# Patient Record
Sex: FEMALE | Race: WHITE | NOT HISPANIC OR LATINO | Employment: PART TIME | URBAN - METROPOLITAN AREA
[De-identification: names, ages, dates, MRNs, and addresses within clinical notes are randomized per-mention and may not be internally consistent; named-entity substitution may affect disease eponyms.]

---

## 2020-06-27 ENCOUNTER — APPOINTMENT (EMERGENCY)
Dept: RADIOLOGY | Facility: HOSPITAL | Age: 30
DRG: 176 | End: 2020-06-27
Payer: COMMERCIAL

## 2020-06-27 ENCOUNTER — HOSPITAL ENCOUNTER (INPATIENT)
Facility: HOSPITAL | Age: 30
LOS: 2 days | Discharge: HOME | DRG: 176 | End: 2020-06-29
Attending: EMERGENCY MEDICINE | Admitting: INTERNAL MEDICINE
Payer: COMMERCIAL

## 2020-06-27 ENCOUNTER — APPOINTMENT (EMERGENCY)
Dept: CT IMAGING | Facility: HOSPITAL | Age: 30
DRG: 176 | End: 2020-06-27
Payer: COMMERCIAL

## 2020-06-27 DIAGNOSIS — I26.99 ACUTE PULMONARY EMBOLISM (HCC): Primary | ICD-10-CM

## 2020-06-27 DIAGNOSIS — R94.31 EKG ABNORMALITY: ICD-10-CM

## 2020-06-27 PROBLEM — R07.9 CHEST PAIN: Status: ACTIVE | Noted: 2020-06-27

## 2020-06-27 LAB
ALBUMIN SERPL BCP-MCNC: 3.7 G/DL (ref 3.5–5)
ALP SERPL-CCNC: 67 U/L (ref 46–116)
ALT SERPL W P-5'-P-CCNC: 17 U/L (ref 12–78)
ANION GAP SERPL CALCULATED.3IONS-SCNC: 9 MMOL/L (ref 4–13)
APTT PPP: 130 SECONDS (ref 23–37)
APTT PPP: 31 SECONDS (ref 23–37)
AST SERPL W P-5'-P-CCNC: 22 U/L (ref 5–45)
BASOPHILS # BLD AUTO: 0.04 THOUSANDS/ΜL (ref 0–0.1)
BASOPHILS NFR BLD AUTO: 0 % (ref 0–1)
BILIRUB SERPL-MCNC: 0.9 MG/DL (ref 0.2–1)
BUN SERPL-MCNC: 14 MG/DL (ref 5–25)
CALCIUM SERPL-MCNC: 9.1 MG/DL (ref 8.3–10.1)
CHLORIDE SERPL-SCNC: 104 MMOL/L (ref 100–108)
CO2 SERPL-SCNC: 27 MMOL/L (ref 21–32)
CREAT SERPL-MCNC: 0.68 MG/DL (ref 0.6–1.3)
D DIMER PPP FEU-MCNC: 1.59 UG/ML FEU
EOSINOPHIL # BLD AUTO: 0.4 THOUSAND/ΜL (ref 0–0.61)
EOSINOPHIL NFR BLD AUTO: 4 % (ref 0–6)
ERYTHROCYTE [DISTWIDTH] IN BLOOD BY AUTOMATED COUNT: 12.4 % (ref 11.6–15.1)
EXT PREG TEST URINE: NEGATIVE
EXT. CONTROL ED NAV: NORMAL
GFR SERPL CREATININE-BSD FRML MDRD: 117 ML/MIN/1.73SQ M
GLUCOSE SERPL-MCNC: 81 MG/DL (ref 65–140)
HCT VFR BLD AUTO: 44.1 % (ref 34.8–46.1)
HGB BLD-MCNC: 14.8 G/DL (ref 11.5–15.4)
IMM GRANULOCYTES # BLD AUTO: 0.02 THOUSAND/UL (ref 0–0.2)
IMM GRANULOCYTES NFR BLD AUTO: 0 % (ref 0–2)
INR PPP: 1.18 (ref 0.84–1.19)
LYMPHOCYTES # BLD AUTO: 2.61 THOUSANDS/ΜL (ref 0.6–4.47)
LYMPHOCYTES NFR BLD AUTO: 27 % (ref 14–44)
MCH RBC QN AUTO: 29.5 PG (ref 26.8–34.3)
MCHC RBC AUTO-ENTMCNC: 33.6 G/DL (ref 31.4–37.4)
MCV RBC AUTO: 88 FL (ref 82–98)
MONOCYTES # BLD AUTO: 0.66 THOUSAND/ΜL (ref 0.17–1.22)
MONOCYTES NFR BLD AUTO: 7 % (ref 4–12)
NEUTROPHILS # BLD AUTO: 5.8 THOUSANDS/ΜL (ref 1.85–7.62)
NEUTS SEG NFR BLD AUTO: 62 % (ref 43–75)
NRBC BLD AUTO-RTO: 0 /100 WBCS
PLATELET # BLD AUTO: 180 THOUSANDS/UL (ref 149–390)
PMV BLD AUTO: 11.8 FL (ref 8.9–12.7)
POTASSIUM SERPL-SCNC: 3.8 MMOL/L (ref 3.5–5.3)
PROT SERPL-MCNC: 7.7 G/DL (ref 6.4–8.2)
PROTHROMBIN TIME: 14.7 SECONDS (ref 11.6–14.5)
RBC # BLD AUTO: 5.01 MILLION/UL (ref 3.81–5.12)
SARS-COV-2 RNA RESP QL NAA+PROBE: NEGATIVE
SODIUM SERPL-SCNC: 140 MMOL/L (ref 136–145)
TROPONIN I SERPL-MCNC: <0.02 NG/ML
TROPONIN I SERPL-MCNC: <0.02 NG/ML
WBC # BLD AUTO: 9.53 THOUSAND/UL (ref 4.31–10.16)

## 2020-06-27 PROCEDURE — 84484 ASSAY OF TROPONIN QUANT: CPT | Performed by: EMERGENCY MEDICINE

## 2020-06-27 PROCEDURE — 99285 EMERGENCY DEPT VISIT HI MDM: CPT

## 2020-06-27 PROCEDURE — 85730 THROMBOPLASTIN TIME PARTIAL: CPT | Performed by: INTERNAL MEDICINE

## 2020-06-27 PROCEDURE — 36415 COLL VENOUS BLD VENIPUNCTURE: CPT | Performed by: EMERGENCY MEDICINE

## 2020-06-27 PROCEDURE — 99223 1ST HOSP IP/OBS HIGH 75: CPT | Performed by: INTERNAL MEDICINE

## 2020-06-27 PROCEDURE — 85025 COMPLETE CBC W/AUTO DIFF WBC: CPT | Performed by: EMERGENCY MEDICINE

## 2020-06-27 PROCEDURE — 99285 EMERGENCY DEPT VISIT HI MDM: CPT | Performed by: EMERGENCY MEDICINE

## 2020-06-27 PROCEDURE — 93005 ELECTROCARDIOGRAM TRACING: CPT

## 2020-06-27 PROCEDURE — 85379 FIBRIN DEGRADATION QUANT: CPT | Performed by: EMERGENCY MEDICINE

## 2020-06-27 PROCEDURE — 71275 CT ANGIOGRAPHY CHEST: CPT

## 2020-06-27 PROCEDURE — 85610 PROTHROMBIN TIME: CPT | Performed by: EMERGENCY MEDICINE

## 2020-06-27 PROCEDURE — 71045 X-RAY EXAM CHEST 1 VIEW: CPT

## 2020-06-27 PROCEDURE — 87635 SARS-COV-2 COVID-19 AMP PRB: CPT | Performed by: INTERNAL MEDICINE

## 2020-06-27 PROCEDURE — 81025 URINE PREGNANCY TEST: CPT | Performed by: EMERGENCY MEDICINE

## 2020-06-27 PROCEDURE — 80053 COMPREHEN METABOLIC PANEL: CPT | Performed by: EMERGENCY MEDICINE

## 2020-06-27 PROCEDURE — 85730 THROMBOPLASTIN TIME PARTIAL: CPT | Performed by: EMERGENCY MEDICINE

## 2020-06-27 RX ORDER — SERTRALINE HYDROCHLORIDE 100 MG/1
100 TABLET, FILM COATED ORAL
COMMUNITY

## 2020-06-27 RX ORDER — ASPIRIN 81 MG/1
324 TABLET, CHEWABLE ORAL ONCE
Status: COMPLETED | OUTPATIENT
Start: 2020-06-27 | End: 2020-06-27

## 2020-06-27 RX ORDER — HEPARIN SODIUM 1000 [USP'U]/ML
3200 INJECTION, SOLUTION INTRAVENOUS; SUBCUTANEOUS
Status: DISCONTINUED | OUTPATIENT
Start: 2020-06-27 | End: 2020-06-28

## 2020-06-27 RX ORDER — HEPARIN SODIUM 10000 [USP'U]/100ML
3-30 INJECTION, SOLUTION INTRAVENOUS
Status: DISCONTINUED | OUTPATIENT
Start: 2020-06-27 | End: 2020-06-28

## 2020-06-27 RX ORDER — FEXOFENADINE HYDROCHLORIDE 60 MG/1
60 TABLET, FILM COATED ORAL DAILY
COMMUNITY

## 2020-06-27 RX ORDER — HEPARIN SODIUM 1000 [USP'U]/ML
6400 INJECTION, SOLUTION INTRAVENOUS; SUBCUTANEOUS
Status: DISCONTINUED | OUTPATIENT
Start: 2020-06-27 | End: 2020-06-28

## 2020-06-27 RX ORDER — OXYCODONE HYDROCHLORIDE AND ACETAMINOPHEN 5; 325 MG/1; MG/1
1 TABLET ORAL EVERY 4 HOURS PRN
Status: DISCONTINUED | OUTPATIENT
Start: 2020-06-27 | End: 2020-06-29 | Stop reason: HOSPADM

## 2020-06-27 RX ORDER — HEPARIN SODIUM 1000 [USP'U]/ML
6400 INJECTION, SOLUTION INTRAVENOUS; SUBCUTANEOUS ONCE
Status: COMPLETED | OUTPATIENT
Start: 2020-06-27 | End: 2020-06-27

## 2020-06-27 RX ADMIN — HEPARIN SODIUM AND DEXTROSE 18 UNITS/KG/HR: 10000; 5 INJECTION INTRAVENOUS at 11:07

## 2020-06-27 RX ADMIN — IOHEXOL 85 ML: 350 INJECTION, SOLUTION INTRAVENOUS at 09:50

## 2020-06-27 RX ADMIN — HEPARIN SODIUM 6400 UNITS: 1000 INJECTION INTRAVENOUS; SUBCUTANEOUS at 11:07

## 2020-06-27 RX ADMIN — OXYCODONE HYDROCHLORIDE AND ACETAMINOPHEN 1 TABLET: 5; 325 TABLET ORAL at 15:13

## 2020-06-27 RX ADMIN — ASPIRIN 81 MG 324 MG: 81 TABLET ORAL at 07:18

## 2020-06-27 RX ADMIN — MORPHINE SULFATE 2 MG: 2 INJECTION, SOLUTION INTRAMUSCULAR; INTRAVENOUS at 16:06

## 2020-06-27 NOTE — PLAN OF CARE
Problem: PAIN - ADULT  Goal: Verbalizes/displays adequate comfort level or baseline comfort level  Description  Interventions:  - Encourage patient to monitor pain and request assistance  - Assess pain using appropriate pain scale  - Administer analgesics based on type and severity of pain and evaluate response  - Implement non-pharmacological measures as appropriate and evaluate response  - Consider cultural and social influences on pain and pain management  - Notify physician/advanced practitioner if interventions unsuccessful or patient reports new pain  Outcome: Progressing     Problem: INFECTION - ADULT  Goal: Absence or prevention of progression during hospitalization  Description  INTERVENTIONS:  - Assess and monitor for signs and symptoms of infection  - Monitor lab/diagnostic results  - Monitor all insertion sites, i e  indwelling lines, tubes, and drains  - Monitor endotracheal if appropriate and nasal secretions for changes in amount and color  - [place] appropriate cooling/warming therapies per order  - Administer medications as ordered  - Instruct and encourage patient and family to use good hand hygiene technique  - Identify and instruct in appropriate isolation precautions for identified infection/condition  Outcome: Progressing     Problem: SAFETY ADULT  Goal: Patient will remain free of falls  Description  INTERVENTIONS:  - Assess patient frequently for physical needs  -  Identify cognitive and physical deficits and behaviors that affect risk of falls    -  [place] fall precautions as indicated by assessment   - Educate patient/family on patient safety including physical limitations  - Instruct patient to call for assistance with activity based on assessment  - Modify environment to reduce risk of injury  - Consider OT/PT consult to assist with strengthening/mobility  Outcome: Progressing     Problem: SAFETY ADULT  Goal: Maintain or return to baseline ADL function  Description  INTERVENTIONS:  -  Assess patient's ability to carry out ADLs; assess patient's baseline for ADL function and identify physical deficits which impact ability to perform ADLs (bathing, care of mouth/teeth, toileting, grooming, dressing, etc )  - Assess/evaluate cause of self-care deficits   - Assess range of motion  - Assess patient's mobility; develop plan if impaired  - Assess patient's need for assistive devices and provide as appropriate  - Encourage maximum independence but intervene and supervise when necessary  - Involve family in performance of ADLs  - Assess for home care needs following discharge   - Consider OT consult to assist with ADL evaluation and planning for discharge  - Provide patient education as appropriate  Outcome: Progressing     Problem: SAFETY ADULT  Goal: Maintain or return mobility status to optimal level  Description  INTERVENTIONS:  - Assess patient's baseline mobility status (ambulation, transfers, stairs, etc )    - Identify cognitive and physical deficits and behaviors that affect mobility  - Identify mobility aids required to assist with transfers and/or ambulation (gait belt, sit-to-stand, lift, walker, cane, etc )  - [place] fall precautions as indicated by assessment  - Record patient progress and toleration of activity level on Mobility SBAR; progress patient to next Phase/Stage  - Instruct patient to call for assistance with activity based on assessment  - Consider rehabilitation consult to assist with strengthening/weightbearing, etc   Outcome: Progressing     Problem: DISCHARGE PLANNING  Goal: Discharge to home or other facility with appropriate resources  Description  INTERVENTIONS:  - Identify barriers to discharge w/patient and caregiver  - Arrange for needed discharge resources and transportation as appropriate  - Identify discharge learning needs (meds, wound care, etc )  - Arrange for interpretive services to assist at discharge as needed  - Refer to Case Management Department for coordinating discharge planning if the patient needs post-hospital services based on physician/advanced practitioner order or complex needs related to functional status, cognitive ability, or social support system  Outcome: Progressing

## 2020-06-27 NOTE — ED CARE HANDOFF
Emergency Department Sign Out Note        Sign out and transfer of care from Dr Frankie Smith  See Separate Emergency Department note  The patient, Mimi Hinkle, was evaluated by the previous provider for Pleuritic left chest pain  Workup Completed:  Labs, EKG and CXR done    ED Course / Workup Pending (followup): Patient endorses pleuritic left-sided chest pain  She is on birth control pill  D-dimer was elevated  CT scan to rule out PE was ordered  We do need her urine pregnancy 1st          repeat EKG is improved with T-wave inversions now resolved  Scan shows a subacute left lower lobe peripheral PE according to the radiologist   Patient will be started on IV heparin and admitted for cardiopulmonary workup      PERC Rule for PE      Most Recent Value   PERC Rule for PE   Age >=50  0 Filed at: 06/27/2020 0717   HR >=100  0 Filed at: 06/27/2020 0717   O2 Sat on room air < 95%  0 Filed at: 06/27/2020 7156   History of PE or DVT  0 Filed at: 06/27/2020 3896   Recent trauma or surgery  0 Filed at: 06/27/2020 0717   Hemoptysis  0 Filed at: 06/27/2020 8189   Exogenous estrogen  1 Filed at: 06/27/2020 4532   Unilateral leg swelling  0 Filed at: 06/27/2020 3707   PERC Rule for PE Results  1 Filed at: 06/27/2020 1269              Wells' Criteria for PE      Most Recent Value   Wells' Criteria for PE   Clinical signs and symptoms of DVT  0 Filed at: 06/27/2020 2234   PE is primary diagnosis or equally likely  0 Filed at: 06/27/2020 0715   HR >100  0 Filed at: 06/27/2020 0715   Immobilization at least 3 days or Surgery in the previous 4 weeks  0 Filed at: 06/27/2020 0715   Previous, objectively diagnosed PE or DVT  0 Filed at: 06/27/2020 0715   Hemoptysis  0 Filed at: 06/27/2020 0715   Malignancy with treatment within 6 months or palliative  0 Filed at: 06/27/2020 0715   Wells' Criteria Total  0 Filed at: 06/27/2020 1623               Procedures  MDM  Number of Diagnoses or Management Options  Acute pulmonary embolism [facility]): new and requires workup  EKG abnormality: new and requires workup  Diagnosis management comments: 42-year-old female with new onset atypical chest pain  There is a pleuritic component to this  She does have a subacute small PE on the left cord Radiology  Patient also had T-wave inversions inferolaterally that cleared on the repeat EKG done 3 hours later  Delta troponin remained in normal range  Will start IV heparin and admit patient to hospital for further workup  Amount and/or Complexity of Data Reviewed  Clinical lab tests: reviewed  Tests in the radiology section of CPT®: ordered and reviewed  Review and summarize past medical records: yes  Discuss the patient with other providers: yes  Independent visualization of images, tracings, or specimens: yes        Disposition  Final diagnoses:   None     ED Disposition     None      Follow-up Information    None       Patient's Medications    No medications on file     No discharge procedures on file         ED Provider  Electronically Signed by     Grisel Finn DO  06/27/20 1058

## 2020-06-27 NOTE — H&P
H&P- Juan R Arguelles 1990, 30 y o  female MRN: [id number]    Unit/Bed#: -01 Encounter: [id number]    Primary Care Provider: Rui Bedoya DO   Date and time admitted to hospital: 6/27/2020  6:45 AM        Pulmonary embolism [facility])  Assessment & Plan  ill continue to monitor on telemetry and continue to heparin  If patient is doing well tomorrow will transition to Eliquis - I have discussed risks and benefits of anticoagulation and discuss the DOACs versus Coumadin with the patient  She is agreeable to Eliquis at this time and we will have this cost checked prior to discharge  Will get venous Dopplers  Discussed with the patient not to restart OTC, also discussed other prothrombotic pathology  Discussed her jittery causes and family history  Patient has no family history of DVT PE  This would appear to be an unprovoked PE however small  I discussed 3-6 months versus lifelong and will refer her to pulmonology for further determination of duration of treatment and follow-up CT scan  Finally we did discuss the remote possibility of malignancy being an underlying cause  She does have a strong family history of colon cancer with her mother being diagnosed at 48  She did have a colonoscopy 5 years ago which was unremarkable  She is due for her next at 34  I did tell her to discuss with her primary care physician regarding revisiting malignancy screening for her in the outpatient setting  * Chest pain  Assessment & Plan  Pleuritic chest pain  Left chest radiating to left shoulder  Oxycodone not helping  Will prescribe IV morphine    Likely secondary to small PE    Troponins x3 negative  W      VTE Prophylaxis: Heparin Drip  / sequential compression device   Code Status:  Full code  POLST: POLST form is on file already (pre-hospital)  Discussion with family:  Patient will update family herself    Anticipated Length of Stay:  Patient will be admitted on an Inpatient basis with an anticipated length of stay of  more 2 midnights  Justification for Hospital Stay:  PE acute    Total Time for Visit, including Counseling / Coordination of Care: 45 minutes  Greater than 50% of this total time spent on direct patient counseling and coordination of care  Chief Complaint:   Chest pain pleuritic    History of Present Illness:    Ahmet Zhu is a 30 y o  female who presents with pleuritic chest pain that started early this morning at 5:00 a m  She states the pain is on the left side of her chest up towards her shoulder radiating to the left back  The pain is worse on inspiration and is currently 7/10 30 minutes after receiving oxycodone  She denies any leg swelling or leg pain  She is on oral contraceptives  She denies any recent long-haul travel, long car rides or recent surgeries are bedrest     She has never had a blood clot in the past and also states that nobody in her family has a history of blood clots  She does have a family history of colon cancer, her mother being diagnosed with colon got cancer at the age of 48  She herself had a colonoscopy 5 years ago which was unremarkable  She has had 2 healthy children and her last delivery was 22 months ago  She is not breast feeding  She does not have any other medical conditions apart from seasonal allergies  CT scan reveals a small pulmonary embolus on the left side with no right heart strain evidence  Troponin x3 is negative        Review of Systems:    Review of Systems   Constitutional: Negative for activity change, appetite change, chills, diaphoresis, fatigue, fever and unexpected weight change  HENT: Negative for congestion, ear discharge, ear pain, facial swelling, hearing loss, rhinorrhea, sinus pain, sneezing, sore throat, tinnitus and trouble swallowing  Eyes: Negative for photophobia, pain, discharge, redness, itching and visual disturbance  Respiratory: Positive for chest tightness   Negative for apnea, cough, choking, shortness of breath and wheezing  Cardiovascular: Positive for chest pain  Negative for palpitations and leg swelling  Gastrointestinal: Negative for abdominal distention, abdominal pain, anal bleeding, blood in stool, constipation, diarrhea, nausea, rectal pain and vomiting  Endocrine: Negative  Genitourinary: Negative  Musculoskeletal: Negative  Negative for arthralgias, back pain, gait problem, joint swelling, myalgias, neck pain and neck stiffness  Allergic/Immunologic: Negative  Neurological: Negative  Hematological: Negative  Psychiatric/Behavioral: Negative  Past Medical and Surgical History:     Past Medical History:   Diagnosis Date    Depression        Past Surgical History:   Procedure Laterality Date    DILATION AND CURETTAGE OF UTERUS         Meds/Allergies:    Prior to Admission medications    Medication Sig Start Date End Date Taking? Authorizing Provider   sertraline (ZOLOFT) 100 mg tablet Take 100 mg by mouth daily at bedtime   Yes Historical Provider, MD   fexofenadine (ALLEGRA) 60 MG tablet Take 60 mg by mouth daily    Historical Provider, MD     I have reviewed home medications with patient personally  Allergies: Allergies   Allergen Reactions    Cephalexin     Penicillins        Social History:     Marital Status: /Civil Union   Occupation: works as a dental hygienist    Patient Pre-hospital Living Situation: lives with family to small children  Patient Pre-hospital Level of Mobility: fully  Patient Pre-hospital Diet Restrictions: none     Substance Use History:   Social History     Substance and Sexual Activity   Alcohol Use Never    Frequency: Never    Binge frequency: Never     Social History     Tobacco Use   Smoking Status Never Smoker   Smokeless Tobacco Never Used     Social History     Substance and Sexual Activity   Drug Use Never       Family History:    Family History   Problem Relation Age of Onset    Cancer Mother    Zayda Rob Hyperlipidemia Father        Physical Exam:     Vitals:   Blood Pressure: 119/77 (06/27/20 1206)  Pulse: 69 (06/27/20 1206)  Temperature: 99 °F (37 2 °C) (06/27/20 1206)  Temp Source: Temporal (06/27/20 7283)  Respirations: 17 (06/27/20 1206)  Height: 5' 9" (175 3 cm) (06/27/20 2873)  Weight - Scale: 83 6 kg (184 lb 4 9 oz) (06/27/20 1209)  SpO2: 98 % (06/27/20 1206)    Physical Exam   Constitutional: No distress  HENT:   Head: Normocephalic  Mouth/Throat: No oropharyngeal exudate  Eyes: Pupils are equal, round, and reactive to light  Right eye exhibits no discharge  Left eye exhibits no discharge  No scleral icterus  Neck: Normal range of motion  No JVD present  No tracheal deviation present  No thyromegaly present  Cardiovascular: Normal rate  Exam reveals no gallop and no friction rub  No murmur heard  Pulmonary/Chest: Effort normal  No stridor  No respiratory distress  She has no wheezes  She has no rales  Abdominal: Soft  She exhibits no distension and no mass  There is no tenderness  There is no rebound and no guarding  No hernia  Musculoskeletal: She exhibits no edema, tenderness or deformity  Lymphadenopathy:     She has no cervical adenopathy  Neurological: She displays normal reflexes  No cranial nerve deficit or sensory deficit  She exhibits normal muscle tone  Coordination normal    Skin: Skin is warm  No rash noted  She is not diaphoretic  No erythema  There is pallor  Psychiatric: She has a normal mood and affect  Additional Data:     Lab Results: I have personally reviewed pertinent reports        Results from last 7 days   Lab Units 06/27/20  0702   WBC Thousand/uL 9 53   HEMOGLOBIN g/dL 14 8   HEMATOCRIT % 44 1   PLATELETS Thousands/uL 180   NEUTROS PCT % 62   LYMPHS PCT % 27   MONOS PCT % 7   EOS PCT % 4     Results from last 7 days   Lab Units 06/27/20  0702   SODIUM mmol/L 140   POTASSIUM mmol/L 3 8   CHLORIDE mmol/L 104   CO2 mmol/L 27   BUN mg/dL 14   CREATININE mg/dL 0 68   ANION GAP mmol/L 9   CALCIUM mg/dL 9 1   ALBUMIN g/dL 3 7   TOTAL BILIRUBIN mg/dL 0 90   ALK PHOS U/L 67   ALT U/L 17   AST U/L 22   GLUCOSE RANDOM mg/dL 81     Results from last 7 days   Lab Units 06/27/20  0702   INR  1 18                   Imaging: I have personally reviewed pertinent reports  CTA ED chest PE study   Final Result by Randa Meeks MD (06/27 1025)      Clot in the distal left pulmonary artery extending into the segmental branches in the left lower lobe  The clot is peripherally located within the vessels, along the wall, and does not expand vessels, suggesting that it is subacute  Trace left pleural effusion      No pulmonary infarct or right heart strain  I personally discussed this study with Alfie Marinelli on 6/27/2020 at 10:13 AM                            Workstation performed: [id number]         XR chest 1 view portable   ED Interpretation by Amador Hernadez DO (06/27 9751)   No acute infiltrate or pneumothorax      Final Result by Randa Meeks MD (06/27 8539)      No acute cardiopulmonary disease  Workstation performed: [id number]             EKG, Pathology, and Other Studies Reviewed on Admission:   · EKG: EKG not done  Will order  Allscripts / Epic Records Reviewed: Yes     ** Please Note: This note has been constructed using a voice recognition system   **

## 2020-06-27 NOTE — ASSESSMENT & PLAN NOTE
Pleuritic chest pain  Left chest radiating to left shoulder  Oxycodone not helping  Will prescribe IV morphine    Likely secondary to small PE    Troponins x3 negative  OIther DD includes pericarditis, viral infection

## 2020-06-27 NOTE — LETTER
[facility] MED SURG UNIT  [address] [place] [address]  Dept: [phone]    June 29, 2020     Patient: Chaim Hernandez   YOB: 1990   Date of Visit: 6/27/2020       To Whom it May Concern:    Chaim Hernandez is under my professional care  She was seen in the hospital from 6/27/2020   to 06/29/20  She may return to work on 07/06/2020 without limitations  If you have any questions or concerns, please don't hesitate to call           Sincerely,          Zoltan Cummings MD

## 2020-06-27 NOTE — ASSESSMENT & PLAN NOTE
ill continue to monitor on telemetry and continue to heparin  If patient is doing well tomorrow will transition to Eliquis - I have discussed risks and benefits of anticoagulation and discuss the DOACs versus Coumadin with the patient  She is agreeable to Eliquis at this time and we will have this cost checked prior to discharge  Will get venous Dopplers  Discussed with the patient not to restart OTC, also discussed other prothrombotic pathology  Discussed her jittery causes and family history  Patient has no family history of DVT PE  This would appear to be an unprovoked PE however small  I discussed 3-6 months versus lifelong and will refer her to pulmonology for further determination of duration of treatment and follow-up CT scan  Finally we did discuss the remote possibility of malignancy being an underlying cause  She does have a strong family history of colon cancer with her mother being diagnosed at 48  She did have a colonoscopy 5 years ago which was unremarkable  She is due for her next at 34  I did tell her to discuss with her primary care physician regarding revisiting malignancy screening for her in the outpatient setting

## 2020-06-27 NOTE — ED PROVIDER NOTES
History  Chief Complaint   Patient presents with    Chest Pain     heartburn yesterday that feels "different" and hasn't gotten better  L sided CP that radiates to L shoulder, back and under L breast  states movement hurts  denies taking meds for pain  denies SOB  states it hurts to take a deep breath  This is a 33-year-old female presents with sharp left-sided chest pain that radiates into the left shoulder and neck area since 5:00 p m  Yesterday  Denies any fevers cough muscle aches or shortness of breath  She is a nonsmoker does use birth control  No prior history of DVT or PE  Pain does get worse with deep breaths  History provided by:  Patient  Medical Problem   Location:  Left chest  Quality:  Sharp pain  Severity:  Moderate  Onset quality:  Unable to specify  Duration:  1 day  Timing:  Constant  Progression:  Unchanged  Chronicity:  New  Context:  Sharp left-sided chest pain that radiates into the neck and shoulder area  Relieved by:  Nothing  Worsened by:  Deep breaths  Associated symptoms: chest pain        Prior to Admission Medications   Prescriptions Last Dose Informant Patient Reported? Taking?   fexofenadine (ALLEGRA) 60 MG tablet Unknown at Unknown time Self Yes No   Sig: Take 60 mg by mouth daily   sertraline (ZOLOFT) 100 mg tablet 6/26/2020 at Unknown time  Yes Yes   Sig: Take 100 mg by mouth daily at bedtime      Facility-Administered Medications: None       Past Medical History:   Diagnosis Date    Depression        Past Surgical History:   Procedure Laterality Date    DILATION AND CURETTAGE OF UTERUS         Family History   Problem Relation Age of Onset    Cancer Mother     Hyperlipidemia Father      I have reviewed and agree with the history as documented      E-Cigarette/Vaping    E-Cigarette Use Never User      E-Cigarette/Vaping Substances     Social History     Tobacco Use    Smoking status: Never Smoker    Smokeless tobacco: Never Used   Substance Use Topics    Alcohol use: Never     Frequency: Never     Binge frequency: Never    Drug use: Never       Review of Systems   Cardiovascular: Positive for chest pain  All other systems reviewed and are negative  Physical Exam  Physical Exam   Constitutional: She is oriented to person, place, and time  She appears well-developed and well-nourished  No distress  HENT:   Head: Normocephalic and atraumatic  Right Ear: External ear normal    Left Ear: External ear normal    Nose: Nose normal    Eyes: Pupils are equal, round, and reactive to light  EOM are normal  Right eye exhibits no discharge  Left eye exhibits no discharge  No scleral icterus  Neck: Neck supple  No JVD present  No tracheal deviation present  Cardiovascular: Normal rate, regular rhythm and intact distal pulses  Exam reveals no gallop and no friction rub  No murmur heard  Pulmonary/Chest: Effort normal and breath sounds normal  No stridor  No respiratory distress  She has no wheezes  She has no rales  Abdominal: Soft  Bowel sounds are normal  She exhibits no distension  There is no tenderness  There is no guarding  Musculoskeletal: Normal range of motion  She exhibits no edema, tenderness or deformity  Neurological: She is alert and oriented to person, place, and time  No cranial nerve deficit or sensory deficit  She exhibits normal muscle tone  Coordination normal    Skin: Skin is warm and dry  No rash noted  She is not diaphoretic  Psychiatric: She has a normal mood and affect  Her behavior is normal  Thought content normal    Nursing note and vitals reviewed        Vital Signs  ED Triage Vitals [06/27/20 0653]   Temperature Pulse Respirations Blood Pressure SpO2   98 °F (36 7 °C) 94 18 159/95 97 %      Temp Source Heart Rate Source Patient Position - Orthostatic VS BP Location FiO2 (%)   Temporal Monitor Sitting Left arm --      Pain Score       6           Vitals:    06/28/20 0729 06/28/20 1723 06/28/20 2334 06/29/20 0801   BP: 117/81 106/66 108/65 132/83   Pulse: 61 80 76 67   Patient Position - Orthostatic VS:             Visual Acuity      ED Medications  Medications   oxyCODONE-acetaminophen (PERCOCET) 5-325 mg per tablet 1 tablet (1 tablet Oral Given 6/27/20 1513)   morphine injection 2 mg (2 mg Intravenous Given 6/28/20 2103)   apixaban (ELIQUIS) tablet 10 mg (10 mg Oral Given 6/29/20 0915)   acetaminophen (TYLENOL) tablet 975 mg (975 mg Oral Given 6/29/20 0915)   aspirin chewable tablet 324 mg (324 mg Oral Given 6/27/20 0718)   iohexol (OMNIPAQUE) 350 MG/ML injection (MULTI-DOSE) 85 mL (85 mL Intravenous Given 6/27/20 0950)   heparin (porcine) injection 6,400 Units (6,400 Units Intravenous Given 6/27/20 1107)       Diagnostic Studies  Results Reviewed     Procedure Component Value Units Date/Time    Troponin I [[id number]]  (Normal) Collected:  06/27/20 1021    Lab Status:  Final result Specimen:  Blood from Arm, Left Updated:  06/27/20 1053     Troponin I <0 02 ng/mL     POCT pregnancy, urine [[id number]]  (Normal) Resulted:  06/27/20 0909    Lab Status:  Final result Updated:  06/27/20 0910     EXT PREG TEST UR (Ref: Negative) NEGATIVE     Control VALID    Protime-INR [[id number]]  (Abnormal) Collected:  06/27/20 0702    Lab Status:  Final result Specimen:  Blood Updated:  06/27/20 0744     Protime 14 7 seconds      INR 1 18    APTT [[id number]]  (Normal) Collected:  06/27/20 0702    Lab Status:  Final result Specimen:  Blood Updated:  06/27/20 0744     PTT 31 seconds     D-Dimer [[id number]]  (Abnormal) Collected:  06/27/20 0702    Lab Status:  Final result Specimen:  Blood Updated:  06/27/20 0744     D-Dimer, Quant 1 59 ug/ml FEU     Troponin I [[id number]]  (Normal) Collected:  06/27/20 0702    Lab Status:  Final result Specimen:  Blood from Arm, Right Updated:  06/27/20 0742     Troponin I <0 02 ng/mL     Comprehensive metabolic panel [[id number]] Collected:  06/27/20 0702    Lab Status:  Final result Specimen:  Blood from Arm, Right Updated:  06/27/20 0739     Sodium 140 mmol/L      Potassium 3 8 mmol/L      Chloride 104 mmol/L      CO2 27 mmol/L      ANION GAP 9 mmol/L      BUN 14 mg/dL      Creatinine 0 68 mg/dL      Glucose 81 mg/dL      Calcium 9 1 mg/dL      AST 22 U/L      ALT 17 U/L      Alkaline Phosphatase 67 U/L      Total Protein 7 7 g/dL      Albumin 3 7 g/dL      Total Bilirubin 0 90 mg/dL      eGFR 117 ml/min/1 73sq m     Narrative:       National Kid[employer] Disease Foundation guidelines for Chronic Kidney Disease (CKD):     Stage 1 with normal or high GFR (GFR > 90 mL/min/1 73 square meters)    Stage 2 Mild CKD (GFR = 60-89 mL/min/1 73 square meters)    Stage 3A Moderate CKD (GFR = 45-59 mL/min/1 73 square meters)    Stage 3B Moderate CKD (GFR = 30-44 mL/min/1 73 square meters)    Stage 4 Severe CKD (GFR = 15-29 mL/min/1 73 square meters)    Stage 5 End Stage CKD (GFR <15 mL/min/1 73 square meters)  Note: GFR calculation is accurate only with a steady state creatinine    CBC and differential [[id number]] Collected:  06/27/20 0702    Lab Status:  Final result Specimen:  Blood from Arm, Right Updated:  06/27/20 0716     WBC 9 53 Thousand/uL      RBC 5 01 Million/uL      Hemoglobin 14 8 g/dL      Hematocrit 44 1 %      MCV 88 fL      MCH 29 5 pg      MCHC 33 6 g/dL      RDW 12 4 %      MPV 11 8 fL      Platelets 820 Thousands/uL      nRBC 0 /100 WBCs      Neutrophils Relative 62 %      Immat GRANS % 0 %      Lymphocytes Relative 27 %      Monocytes Relative 7 %      Eosinophils Relative 4 %      Basophils Relative 0 %      Neutrophils Absolute 5 80 Thousands/µL      Immature Grans Absolute 0 02 Thousand/uL      Lymphocytes Absolute 2 61 Thousands/µL      Monocytes Absolute 0 66 Thousand/µL      Eosinophils Absolute 0 40 Thousand/µL      Basophils Absolute 0 04 Thousands/µL                  CTA ED chest PE study   Final Result by Kendall Leroy MD (06/27 1025)      Clot in the distal left pulmonary artery extending into the segmental branches in the left lower lobe  The clot is peripherally located within the vessels, along the wall, and does not expand vessels, suggesting that it is subacute  Trace left pleural effusion      No pulmonary infarct or right heart strain  I personally discussed this study with Brennon Blount on 6/27/2020 at 10:13 AM                            Workstation performed: [id number]         XR chest 1 view portable   ED Interpretation by Tyrell Boswell DO (06/27 0566)   No acute infiltrate or pneumothorax      Final Result by Viktoriya Luna MD (06/27 5285)      No acute cardiopulmonary disease  Workstation performed: [id number]         VAS lower limb venous duplex study, complete bilateral    (Results Pending)              Procedures  ECG 12 Lead Documentation Only  Date/Time: 6/27/2020 7:10 AM  Performed by: Tyrell Boswell DO  Authorized by: Tyrell Boswell DO     ECG reviewed by me, the ED Provider: yes    Patient location:  ED  Rate:     ECG rate:  93  Rhythm:     Rhythm: sinus rhythm    T waves:     T waves: inverted      Inverted:  II, III, aVF, V5 and V6             ED Course       US AUDIT      Most Recent Value   Initial Alcohol Screen: US AUDIT-C    1  How often do you have a drink containing alcohol?  0 Filed at: 06/27/2020 0654   2  How many drinks containing alcohol do you have on a typical day you are drinking? 0 Filed at: 06/27/2020 0654   3b  FEMALE Any Age, or MALE 65+: How often do you have 4 or more drinks on one occassion? 0 Filed at: 06/27/2020 0654   Audit-C Score  0 Filed at: 06/27/2020 0654                  LIS/DAST-10      Most Recent Value   How many times in the past year have you    Used an illegal drug or used a prescription medication for non-medical reasons?   Never Filed at: 06/27/2020 0654          PERC Rule for PE      Most Recent Value   PERC Rule for PE   Age >=50  0 Filed at: 06/27/2020 0717   HR >=100  0 Filed at: 06/27/2020 0717   O2 Sat on room air < 95% 0 Filed at: 06/27/2020 0717   History of PE or DVT  0 Filed at: 06/27/2020 2355   Recent trauma or surgery  0 Filed at: 06/27/2020 0717   Hemoptysis  0 Filed at: 06/27/2020 1104   Exogenous estrogen  1 Filed at: 06/27/2020 0717   Unilateral leg swelling  0 Filed at: 06/27/2020 0717   PERC Rule for PE Results  1 Filed at: 06/27/2020 2598          Initial Sepsis Screening     Row Name 06/27/20 1036                Is the patient's history suggestive of a new or worsening infection? No  -EP        Suspected source of infection          Are two or more of the following signs & symptoms of infection both present and new to the patient? No  -EP        Indicate SIRS criteria          If the answer is yes to both questions, suspicion of sepsis is present          If severe sepsis is present AND tissue hypoperfusion perists in the hour after fluid resuscitation or lactate > 4, the patient meets criteria for SEPTIC SHOCK          Are any of the following organ dysfunction criteria present within 6 hours of suspected infection and SIRS criteria that are NOT considered to be chronic conditions?         Organ dysfunction          Date of presentation of severe sepsis          Time of presentation of severe sepsis          Tissue hypoperfusion persists in the hour after crystalloid fluid administration, evidenced, by either:          Was hypotension present within one hour of the conclusion of crystalloid fluid administration?           Date of presentation of septic shock          Time of presentation of septic shock            User Key  (r) = Recorded By, (t) = Taken By, (c) = Cosigned By    [address] Name Provider Keegan Navarro MD Physician            Jona Wood' Criteria for PE      Most Recent Value   Amando' Criteria for PE   Clinical signs and symptoms of DVT  0 Filed at: 06/27/2020 0715   PE is primary diagnosis or equally likely  0 Filed at: 06/27/2020 0715   HR >100  0 Filed at: 06/27/2020 6136 Immobilization at least 3 days or Surgery in the previous 4 weeks  0 Filed at: 06/27/2020 0715   Previous, objectively diagnosed PE or DVT  0 Filed at: 06/27/2020 0715   Hemoptysis  0 Filed at: 06/27/2020 0715   Malignancy with treatment within 6 months or palliative  0 Filed at: 06/27/2020 6468   Shirley Gentile' Criteria Total  0 Filed at: 06/27/2020 0715                  [facility]  Number of Diagnoses or Management Options  Diagnosis management comments: Chest pain acute coronary syndrome versus pulmonary embolism musculoskeletal pain pneumothorax EKG chest x-ray and labs ordered       Amount and/or Complexity of Data Reviewed  Clinical lab tests: ordered  Tests in the radiology section of CPT®: ordered          Disposition  Final diagnoses:   Acute pulmonary embolism ([address] )   EKG abnormality     Time reflects when diagnosis was documented in both MDM as applicable and the Disposition within this note     Time User Action Codes Description Comment    6/27/2020 10:49 AM Silke Aruna Add [I26 99] Acute pulmonary embolism ([address] )     6/27/2020 10:49 AM Silke Aruna Add [R94 31] EKG abnormality       ED Disposition     ED Disposition Condition Date/Time Comment    Admit Stable Sat Jun 27, 2020 10:47 AM Case was discussed with Dr Christo Garvin and the patient's admission status was agreed to be Admission Status: inpatient status to the service of Dr Christo Garvin   Follow-up Information     Follow up With Specialties Details Why Contact Selvin Contreras DO Family Medicine Schedule an appointment as soon as possible for a visit in 1 week(s)  [address]            Current Discharge Medication List      START taking these medications    Details   !! apixaban (ELIQUIS) 5 mg Take 2 tablets (10 mg total) by mouth 2 (two) times a day for 6 days  Qty: 24 tablet, Refills: 0    Associated Diagnoses: Acute pulmonary embolism ([address] )      ! ! apixaban (ELIQUIS) 5 mg Take 1 tablet (5 mg total) by mouth 2 (two) times a day  Qty: 60 tablet, Refills: 0    Associated Diagnoses: Acute pulmonary embolism ([facility] [address] )       ! ! - Potential duplicate medications found  Please discuss with provider  CONTINUE these medications which have NOT CHANGED    Details   sertraline (ZOLOFT) 100 mg tablet Take 100 mg by mouth daily at bedtime      fexofenadine (ALLEGRA) 60 MG tablet Take 60 mg by mouth daily           No discharge procedures on file      PDMP Review     None          ED Provider  Electronically Signed by           Kristopher Marte DO  06/29/20 8237

## 2020-06-28 LAB
ALBUMIN SERPL BCP-MCNC: 3.4 G/DL (ref 3.5–5)
ALP SERPL-CCNC: 68 U/L (ref 46–116)
ALT SERPL W P-5'-P-CCNC: 14 U/L (ref 12–78)
ANION GAP SERPL CALCULATED.3IONS-SCNC: 9 MMOL/L (ref 4–13)
APTT PPP: 58 SECONDS (ref 23–37)
APTT PPP: 93 SECONDS (ref 23–37)
AST SERPL W P-5'-P-CCNC: 14 U/L (ref 5–45)
ATRIAL RATE: 75 BPM
ATRIAL RATE: 93 BPM
BILIRUB SERPL-MCNC: 0.6 MG/DL (ref 0.2–1)
BUN SERPL-MCNC: 12 MG/DL (ref 5–25)
CALCIUM SERPL-MCNC: 8.7 MG/DL (ref 8.3–10.1)
CHLORIDE SERPL-SCNC: 104 MMOL/L (ref 100–108)
CO2 SERPL-SCNC: 26 MMOL/L (ref 21–32)
CREAT SERPL-MCNC: 0.72 MG/DL (ref 0.6–1.3)
ERYTHROCYTE [DISTWIDTH] IN BLOOD BY AUTOMATED COUNT: 12.6 % (ref 11.6–15.1)
GFR SERPL CREATININE-BSD FRML MDRD: 112 ML/MIN/1.73SQ M
GLUCOSE SERPL-MCNC: 92 MG/DL (ref 65–140)
HCT VFR BLD AUTO: 40.9 % (ref 34.8–46.1)
HGB BLD-MCNC: 13.8 G/DL (ref 11.5–15.4)
MCH RBC QN AUTO: 29.8 PG (ref 26.8–34.3)
MCHC RBC AUTO-ENTMCNC: 33.7 G/DL (ref 31.4–37.4)
MCV RBC AUTO: 88 FL (ref 82–98)
P AXIS: 59 DEGREES
P AXIS: 71 DEGREES
PLATELET # BLD AUTO: 156 THOUSANDS/UL (ref 149–390)
PMV BLD AUTO: 11.4 FL (ref 8.9–12.7)
POTASSIUM SERPL-SCNC: 3.7 MMOL/L (ref 3.5–5.3)
PR INTERVAL: 134 MS
PR INTERVAL: 134 MS
PROT SERPL-MCNC: 7.3 G/DL (ref 6.4–8.2)
QRS AXIS: 54 DEGREES
QRS AXIS: 69 DEGREES
QRSD INTERVAL: 94 MS
QRSD INTERVAL: 94 MS
QT INTERVAL: 396 MS
QT INTERVAL: 440 MS
QTC INTERVAL: 491 MS
QTC INTERVAL: 492 MS
RBC # BLD AUTO: 4.63 MILLION/UL (ref 3.81–5.12)
SODIUM SERPL-SCNC: 139 MMOL/L (ref 136–145)
T WAVE AXIS: 28 DEGREES
T WAVE AXIS: 37 DEGREES
VENTRICULAR RATE: 75 BPM
VENTRICULAR RATE: 93 BPM
WBC # BLD AUTO: 7 THOUSAND/UL (ref 4.31–10.16)

## 2020-06-28 PROCEDURE — 85730 THROMBOPLASTIN TIME PARTIAL: CPT | Performed by: INTERNAL MEDICINE

## 2020-06-28 PROCEDURE — 85027 COMPLETE CBC AUTOMATED: CPT | Performed by: INTERNAL MEDICINE

## 2020-06-28 PROCEDURE — 99232 SBSQ HOSP IP/OBS MODERATE 35: CPT | Performed by: INTERNAL MEDICINE

## 2020-06-28 PROCEDURE — 80053 COMPREHEN METABOLIC PANEL: CPT | Performed by: INTERNAL MEDICINE

## 2020-06-28 PROCEDURE — 93010 ELECTROCARDIOGRAM REPORT: CPT | Performed by: INTERNAL MEDICINE

## 2020-06-28 RX ORDER — HYDROMORPHONE HCL/PF 1 MG/ML
0.5 SYRINGE (ML) INJECTION EVERY 6 HOURS PRN
Status: DISCONTINUED | OUTPATIENT
Start: 2020-06-28 | End: 2020-06-29

## 2020-06-28 RX ADMIN — HEPARIN SODIUM AND DEXTROSE 13 UNITS/KG/HR: 10000; 5 INJECTION INTRAVENOUS at 08:50

## 2020-06-28 RX ADMIN — APIXABAN 10 MG: 5 TABLET, FILM COATED ORAL at 19:34

## 2020-06-28 RX ADMIN — APIXABAN 10 MG: 5 TABLET, FILM COATED ORAL at 09:51

## 2020-06-28 RX ADMIN — MORPHINE SULFATE 2 MG: 2 INJECTION, SOLUTION INTRAMUSCULAR; INTRAVENOUS at 04:47

## 2020-06-28 RX ADMIN — MORPHINE SULFATE 2 MG: 2 INJECTION, SOLUTION INTRAMUSCULAR; INTRAVENOUS at 21:03

## 2020-06-28 NOTE — ASSESSMENT & PLAN NOTE
Pleuritic chest pain  Left chest radiating to left shoulder  Worse pain today  Oxycodone not helping  Will prescribe IV morphine - limited effect  Will add Dilaudid  Also transitioned from heparin to Eliquis today    Likely secondary to small PE    Troponins x3 negative  OIther DD includes pericarditis, viral infection - awaiting echo

## 2020-06-28 NOTE — PROGRESS NOTES
Progress Note - Mery Auguste 1990, 30 y o  female MRN: [id number]    Unit/Bed#: -01 Encounter: [id number]    Primary Care Provider: Shayy Amos,    Date and time admitted to hospital: 6/27/2020  6:45 AM        Pulmonary embolism [facility])  Assessment & Plan  Transition to Eliquis from heparin  Discussed with the patient not to restart OTC, also discussed other prothrombotic pathology  Discussed her jittery causes and family history  Patient has no family history of DVT PE  This would appear to be an unprovoked PE however small  I discussed 3-6 months versus lifelong and will refer her to pulmonology for further determination of duration of treatment and follow-up CT scan  Finally we did discuss the remote possibility of malignancy being an underlying cause  She does have a strong family history of colon cancer with her mother being diagnosed at 48  She did have a colonoscopy 5 years ago which was unremarkable  She is due for her next at 34  I did tell her to discuss with her primary care physician regarding revisiting malignancy screening for her in the outpatient setting  * Chest pain  Assessment & Plan  Pleuritic chest pain  Left chest radiating to left shoulder  Worse pain today  Oxycodone not helping  Will prescribe IV morphine - limited effect  Will add Dilaudid  Also transitioned from heparin to Eliquis today    Likely secondary to small PE  Troponins x3 negative  OIther DD includes pericarditis, viral infection - awaiting echo        VTE Pharmacologic Prophylaxis:   Pharmacologic: Heparin  Mechanical VTE Prophylaxis in Place: Yes    Patient Centered Rounds: I have performed bedside rounds with nursing staff today      Discussions with Specialists or Other Care Team Provider:  Discussed with nursing    Education and Discussions with Family / Patient:  Discussed with the patient - offered to update family however patient just spoke her  does not need me a call    Time Spent for Care: 30 minutes  More than 50% of total time spent on counseling and coordination of care as described above  Current Length of Stay: 1 day(s)    Current Patient Status: Inpatient   Certification Statement: The patient will continue to require additional inpatient hospital stay due to Ongoing pleuritic chest pain  Transitioning from heparin drip to Eliquis  Will increase IV medications    Discharge Plan:  Likely tomorrow    Code Status: Level 1 - Full Code      Subjective:   Patient seen and examined  Pain is worse today    Objective:     Vitals:   Temp (24hrs), Av 3 °F (36 8 °C), Min:97 9 °F (36 6 °C), Max:99 °F (37 2 °C)    Temp:  [97 9 °F (36 6 °C)-99 °F (37 2 °C)] 97 9 °F (36 6 °C)  HR:  [61-90] 61  Resp:  [17-18] 18  BP: (100-122)/(66-81) 117/81  SpO2:  [95 %-98 %] 96 %  Body mass index is 26 46 kg/m²  Input and Output Summary (last 24 hours):     No intake or output data in the 24 hours ending 20 1106    Physical Exam:     Physical Exam   Constitutional: She appears well-developed  No distress  HENT:   Head: Normocephalic  Mouth/Throat: No oropharyngeal exudate  Eyes: Pupils are equal, round, and reactive to light  Right eye exhibits no discharge  Left eye exhibits no discharge  No scleral icterus  Neck: Normal range of motion  No JVD present  No tracheal deviation present  No thyromegaly present  Cardiovascular: Normal rate  Exam reveals no gallop and no friction rub  No murmur heard  Pulmonary/Chest: Effort normal  No stridor  No respiratory distress  She has no wheezes  She has no rales  She exhibits no tenderness  Abdominal: Soft  She exhibits no distension and no mass  There is no tenderness  There is no rebound and no guarding  No hernia  Musculoskeletal: She exhibits no edema, tenderness or deformity  Lymphadenopathy:     She has no cervical adenopathy  Neurological: She is alert  She displays normal reflexes  No cranial nerve deficit or sensory deficit  She exhibits normal muscle tone  Coordination normal    Skin: Capillary refill takes less than 2 seconds  No rash noted  She is not diaphoretic  No erythema  There is pallor  Psychiatric: She has a normal mood and affect  Additional Data:     Labs:    Results from last 7 days   Lab Units 06/28/20  0500 06/27/20  0702   WBC Thousand/uL 7 00 9 53   HEMOGLOBIN g/dL 13 8 14 8   HEMATOCRIT % 40 9 44 1   PLATELETS Thousands/uL 156 180   NEUTROS PCT %  --  62   LYMPHS PCT %  --  27   MONOS PCT %  --  7   EOS PCT %  --  4     Results from last 7 days   Lab Units 06/28/20  0500   SODIUM mmol/L 139   POTASSIUM mmol/L 3 7   CHLORIDE mmol/L 104   CO2 mmol/L 26   BUN mg/dL 12   CREATININE mg/dL 0 72   ANION GAP mmol/L 9   CALCIUM mg/dL 8 7   ALBUMIN g/dL 3 4*   TOTAL BILIRUBIN mg/dL 0 60   ALK PHOS U/L 68   ALT U/L 14   AST U/L 14   GLUCOSE RANDOM mg/dL 92     Results from last 7 days   Lab Units 06/27/20  0702   INR  1 18                       * I Have Reviewed All Lab Data Listed Above  * Additional Pertinent Lab Tests Reviewed: Maryanne 64 Admission Reviewed    Imaging:    Imaging Reports Reviewed Today Include:   None pertinent to this encounter  Imaging Personally Reviewed by Myself Includes:  As above    Recent Cultures (last 7 days):           Last 24 Hours Medication List:     Current Facility-Administered Medications:  apixaban 10 mg Oral BID Mark Bardales MD   HYDROmorphone 0 5 mg Intravenous Q6H PRN Mark Bardales MD   morphine injection 2 mg Intravenous Q4H PRN Mark Bardales MD   oxyCODONE-acetaminophen 1 tablet Oral Q4H PRN Mark Bardales MD        Today, Patient Was Seen By: Mark Bardales MD    ** Please Note: Dictation voice to text software may have been used in the creation of this document   **

## 2020-06-28 NOTE — UTILIZATION REVIEW
Initial Clinical Review    Admission: Date/Time/Statement: Admission Orders (From admission, onward)     Ordered        06/27/20 1037  Inpatient Admission  Once                   Orders Placed This Encounter   Procedures    Inpatient Admission     Standing Status:   Standing     Number of Occurrences:   1     Order Specific Question:   Admitting Physician     Answer:   Adilson Garcia     Order Specific Question:   Level of Care     Answer:   Med Surg [16]     Order Specific Question:   Estimated length of stay     Answer:   More than 2 Midnights     Order Specific Question:   Certification     Answer:   I certify that inpatient services are medically necessary for this patient for a duration of greater than two midnights  See H&P and MD Progress Notes for additional information about the patient's course of treatment  ED Arrival Information     Expected Arrival Acuity Means of Arrival Escorted By Service Admission Type    - 6/27/2020 06:38 Urgent Walk-In Self General Medicine Urgent    Arrival Complaint    Chest Pain        Chief Complaint   Patient presents with    Chest Pain     heartburn yesterday that feels "different" and hasn't gotten better  L sided CP that radiates to L shoulder, back and under L breast  states movement hurts  denies taking meds for pain  denies SOB  states it hurts to take a deep breath  Assessment/Plan: this is a 30year old female from home to ED admitted inpatient due to PE   on BCP  Presented with left sided chest pain radiating to left shoulder and neck starting 1700 on 6/26/2020  On exam is pallor  D dimer 1 59  CTA chest showed PE  Given asa and IV heparin in ED  Telemetry,  Anticoagulation with IV heparin, pain control in progress  On 6/28/2020 feels pain is worse, has used 2 doses IV morphine  Heparin transitioned to Eliquis  Pain control in progress       ED Triage Vitals [06/27/20 0653]   Temperature Pulse Respirations Blood Pressure SpO2   98 °F (36 7 °C) 94 18 159/95 97 %      Temp Source Heart Rate Source Patient Position - Orthostatic VS BP Location FiO2 (%)   Temporal Monitor Sitting Left arm --      Pain Score       6        Wt Readings from Last 1 Encounters:   06/28/20 81 3 kg (179 lb 3 2 oz)     Additional Vital Signs:   06/28/20 07:29:26  97 9 °F (36 6 °C)  61  18  117/81  93  96 %       06/28/20 04:54:34  98 1 °F (36 7 °C)  81  18  122/76  91  95 %  None (Room air)  Lying   06/27/20 22:26:03  98 4 °F (36 9 °C)  78  18  115/70  85  96 %  None (Room air)  Lying   06/27/20 19:19:15  98 1 °F (36 7 °C)  90  18  100/66  77  95 %       06/27/20 12:06:11  99 °F (37 2 °C)  69  17  119/77  91  98 %       06/27/20 1045    66  15      97 %       06/27/20 1030    67  15  118/68  87  99 %       06/27/20 1015    70  15      98 %       06/27/20 1000    71  20  132/77  98  97 %           Pertinent Labs/Diagnostic Test Results:   6/27/2020 CTA chest Clot in the distal left pulmonary artery extending into the segmental branches in the left lower lobe   The clot is peripherally located within the vessels, along the wall, and does not expand vessels, suggesting that it is subacute  Trace left pleural effusion  No pulmonary infarct or right heart strain    6/17/2020 CxR No acute cardiopulmonary disease    Results from last 7 days   Lab Units 06/27/20  1649   SARS-COV-2  Negative     Results from last 7 days   Lab Units 06/28/20  0500 06/27/20  0702   WBC Thousand/uL 7 00 9 53   HEMOGLOBIN g/dL 13 8 14 8   HEMATOCRIT % 40 9 44 1   PLATELETS Thousands/uL 156 180   NEUTROS ABS Thousands/µL  --  5 80     Results from last 7 days   Lab Units 06/28/20  0500 06/27/20  0702   SODIUM mmol/L 139 140   POTASSIUM mmol/L 3 7 3 8   CHLORIDE mmol/L 104 104   CO2 mmol/L 26 27   ANION GAP mmol/L 9 9   BUN mg/dL 12 14   CREATININE mg/dL 0 72 0 68   EGFR ml/min/1 73sq m 112 117   CALCIUM mg/dL 8 7 9 1     Results from last 7 days   Lab Units 06/28/20  0500 06/27/20  0702   AST U/L 14 22   ALT U/L 14 17   ALK PHOS U/L 68 67   TOTAL PROTEIN g/dL 7 3 7 7   ALBUMIN g/dL 3 4* 3 7   TOTAL BILIRUBIN mg/dL 0 60 0 90     Results from last 7 days   Lab Units 06/28/20  0500 06/27/20  0702   GLUCOSE RANDOM mg/dL 92 81     Results from last 7 days   Lab Units 06/27/20  1021 06/27/20  0702   TROPONIN I ng/mL <0 02 <0 02     Results from last 7 days   Lab Units 06/27/20  0702   D-DIMER QUANTITATIVE ug/ml FEU 1 59*     Results from last 7 days   Lab Units 06/28/20  0930 06/28/20  0051 06/27/20  1716 06/27/20  0702   PROTIME seconds  --   --   --  14 7*   INR   --   --   --  1 18   PTT seconds 58* 93* 130* 31       ED Treatment:   Medication Administration from 06/27/2020 7495 to 06/27/2020 1200       Date/Time Order Dose Route Action Comments     06/27/2020 6281 aspirin chewable tablet 324 mg 324 mg Oral Given      06/27/2020 1107 heparin (porcine) injection 6,400 Units 6,400 Units Intravenous Given      06/27/2020 1107 heparin (porcine) 25,000 units in 250 mL infusion (premix) 18 Units/kg/hr Intravenous New Bag         Past Medical History:   Diagnosis Date    Depression      Present on Admission:  **None**      Admitting Diagnosis: Chest pain [R07 9]  EKG abnormality [R94 31]  Acute pulmonary embolism (HCC) [I26 99]  Age/Sex: 30 y o  female  Admission Orders: 6/27/2020 1037 inpatient   Scheduled Medications:  Medications:  apixaban 10 mg Oral BID     Continuous IV Infusions:h eparin (porcine) 25,000 units in 250 mL infusion (premix)   Rate: 2 4-24 mL/hr Dose: 3-30 Units/kg/hr  Weight Dosing Info: 80 kg (Order-Specific)  Freq: Titrated Route: IV  Last Dose: Stopped (06/28/20 1042)  Start: 06/27/20 1100 End: 06/28/20 0928     PRN Meds:  HYDROmorphone 0 5 mg Intravenous Q6H PRN   morphine injection -used  X 2 (4836, 6856) 2 mg Intravenous Q4H PRN   oxyCODONE-acetaminophen - used x 1  1 tablet Oral Q4H PRN         Network Utilization Review Department  [email or web address]  ATTENTION: Please call with any questions or concerns to [phone] and carefully listen to the prompts so that you are directed to the right person  All voicemails are confidential   Miriam Mayer all requests for admission clinical reviews, approved or denied determinations and any other requests to dedicated fax number below belonging to the campus where the patient is receiving treatment   List of dedicated fax numbers for the Facilities:  [address] DENIALS (Administrative/Medical Necessity) [phone]   [address] (Maternity/NICU/Pediatrics) [phone]   Vish Baptise [phone]   Areta Sell [phone]   Nancy Pitch [phone]   Masha Jest [phone]   [address] [place] [address] [phone]   [facility]  [phone]   [address] [facility]  [address] [facility] [address] [facility] [phone]

## 2020-06-28 NOTE — ASSESSMENT & PLAN NOTE
Transition to Eliquis from heparin  Discussed with the patient not to restart OTC, also discussed other prothrombotic pathology  Discussed her jittery causes and family history  Patient has no family history of DVT PE  This would appear to be an unprovoked PE however small  I discussed 3-6 months versus lifelong and will refer her to pulmonology for further determination of duration of treatment and follow-up CT scan  Finally we did discuss the remote possibility of malignancy being an underlying cause  She does have a strong family history of colon cancer with her mother being diagnosed at 48  She did have a colonoscopy 5 years ago which was unremarkable  She is due for her next at 34  I did tell her to discuss with her primary care physician regarding revisiting malignancy screening for her in the outpatient setting

## 2020-06-29 ENCOUNTER — APPOINTMENT (INPATIENT)
Dept: NON INVASIVE DIAGNOSTICS | Facility: HOSPITAL | Age: 30
DRG: 176 | End: 2020-06-29
Payer: COMMERCIAL

## 2020-06-29 VITALS
WEIGHT: 180.1 LBS | DIASTOLIC BLOOD PRESSURE: 83 MMHG | BODY MASS INDEX: 26.67 KG/M2 | SYSTOLIC BLOOD PRESSURE: 132 MMHG | HEART RATE: 67 BPM | TEMPERATURE: 97.9 F | OXYGEN SATURATION: 95 % | RESPIRATION RATE: 16 BRPM | HEIGHT: 69 IN

## 2020-06-29 LAB
ALBUMIN SERPL BCP-MCNC: 3.3 G/DL (ref 3.5–5)
ALP SERPL-CCNC: 76 U/L (ref 46–116)
ALT SERPL W P-5'-P-CCNC: 13 U/L (ref 12–78)
ANION GAP SERPL CALCULATED.3IONS-SCNC: 8 MMOL/L (ref 4–13)
AST SERPL W P-5'-P-CCNC: 11 U/L (ref 5–45)
BASOPHILS # BLD AUTO: 0.04 THOUSANDS/ΜL (ref 0–0.1)
BASOPHILS NFR BLD AUTO: 1 % (ref 0–1)
BILIRUB SERPL-MCNC: 0.4 MG/DL (ref 0.2–1)
BUN SERPL-MCNC: 10 MG/DL (ref 5–25)
CALCIUM SERPL-MCNC: 8.6 MG/DL (ref 8.3–10.1)
CHLORIDE SERPL-SCNC: 104 MMOL/L (ref 100–108)
CO2 SERPL-SCNC: 27 MMOL/L (ref 21–32)
CREAT SERPL-MCNC: 0.73 MG/DL (ref 0.6–1.3)
EOSINOPHIL # BLD AUTO: 0.33 THOUSAND/ΜL (ref 0–0.61)
EOSINOPHIL NFR BLD AUTO: 6 % (ref 0–6)
ERYTHROCYTE [DISTWIDTH] IN BLOOD BY AUTOMATED COUNT: 12.4 % (ref 11.6–15.1)
GFR SERPL CREATININE-BSD FRML MDRD: 110 ML/MIN/1.73SQ M
GLUCOSE SERPL-MCNC: 90 MG/DL (ref 65–140)
HCT VFR BLD AUTO: 42.5 % (ref 34.8–46.1)
HGB BLD-MCNC: 13.9 G/DL (ref 11.5–15.4)
IMM GRANULOCYTES # BLD AUTO: 0.02 THOUSAND/UL (ref 0–0.2)
IMM GRANULOCYTES NFR BLD AUTO: 0 % (ref 0–2)
LYMPHOCYTES # BLD AUTO: 2.22 THOUSANDS/ΜL (ref 0.6–4.47)
LYMPHOCYTES NFR BLD AUTO: 37 % (ref 14–44)
MCH RBC QN AUTO: 29.6 PG (ref 26.8–34.3)
MCHC RBC AUTO-ENTMCNC: 32.7 G/DL (ref 31.4–37.4)
MCV RBC AUTO: 90 FL (ref 82–98)
MONOCYTES # BLD AUTO: 0.43 THOUSAND/ΜL (ref 0.17–1.22)
MONOCYTES NFR BLD AUTO: 7 % (ref 4–12)
NEUTROPHILS # BLD AUTO: 3 THOUSANDS/ΜL (ref 1.85–7.62)
NEUTS SEG NFR BLD AUTO: 49 % (ref 43–75)
NRBC BLD AUTO-RTO: 0 /100 WBCS
PLATELET # BLD AUTO: 162 THOUSANDS/UL (ref 149–390)
PMV BLD AUTO: 11.8 FL (ref 8.9–12.7)
POTASSIUM SERPL-SCNC: 3.5 MMOL/L (ref 3.5–5.3)
PROT SERPL-MCNC: 7 G/DL (ref 6.4–8.2)
RBC # BLD AUTO: 4.7 MILLION/UL (ref 3.81–5.12)
SODIUM SERPL-SCNC: 139 MMOL/L (ref 136–145)
WBC # BLD AUTO: 6.04 THOUSAND/UL (ref 4.31–10.16)

## 2020-06-29 PROCEDURE — 93306 TTE W/DOPPLER COMPLETE: CPT

## 2020-06-29 PROCEDURE — 93970 EXTREMITY STUDY: CPT

## 2020-06-29 PROCEDURE — 99239 HOSP IP/OBS DSCHRG MGMT >30: CPT | Performed by: INTERNAL MEDICINE

## 2020-06-29 PROCEDURE — 85025 COMPLETE CBC W/AUTO DIFF WBC: CPT | Performed by: INTERNAL MEDICINE

## 2020-06-29 PROCEDURE — 93306 TTE W/DOPPLER COMPLETE: CPT | Performed by: INTERNAL MEDICINE

## 2020-06-29 PROCEDURE — 80053 COMPREHEN METABOLIC PANEL: CPT | Performed by: INTERNAL MEDICINE

## 2020-06-29 PROCEDURE — 93970 EXTREMITY STUDY: CPT | Performed by: SURGERY

## 2020-06-29 RX ORDER — ACETAMINOPHEN 325 MG/1
975 TABLET ORAL EVERY 8 HOURS SCHEDULED
Status: DISCONTINUED | OUTPATIENT
Start: 2020-06-29 | End: 2020-06-29 | Stop reason: HOSPADM

## 2020-06-29 RX ORDER — HEPARIN SODIUM 5000 [USP'U]/ML
5000 INJECTION, SOLUTION INTRAVENOUS; SUBCUTANEOUS EVERY 8 HOURS SCHEDULED
Status: DISCONTINUED | OUTPATIENT
Start: 2020-06-29 | End: 2020-06-29

## 2020-06-29 RX ADMIN — ACETAMINOPHEN 975 MG: 325 TABLET ORAL at 09:15

## 2020-06-29 RX ADMIN — APIXABAN 10 MG: 5 TABLET, FILM COATED ORAL at 09:15

## 2020-06-29 NOTE — DISCHARGE INSTR - AVS FIRST PAGE
Dear Loretta Keys,     It was our pleasure to care for you here at [address]  It is our hope that we were always able to exceed the expected standards for your care during your stay  You were hospitalized due to chest pain and you were found to have pulmonary embolism, thus you were placed on IV heparin and transitioned to Eliquis  You were cared for on the general medical floor under the service of Sotero Yen MD with the [facility] who covers for your primary care physician (PCP), Poli Garcia DO, while you were hospitalized  If you have any questions or concerns related to this hospitalization, you may contact us at [phone]  For follow up as well as medication refills, we recommend that you follow up with your primary care physician  A registered nurse will reach out to you by phone within a few days after your discharge to answer any additional questions that you may have after going home  However, at this time we provide for you here, the most important instructions / recommendations at discharge:     · Notable Medication Adjustments -   · Continue Eliquis 10mg BID x 6days  · Then transition to eliquis 5mg BID x 30days, continue this for 3-6 months  · Important follow up information -   · Follow up with your PCP, discuss the continued anticoagulation need with them  · Please review this entire after visit summary as additional general instructions including medication list, appointments, activity, diet, any pertinent wound care, and other additional recommendations from your care team that may be provided for you        Sincerely,     Sotero Yen MD

## 2020-06-29 NOTE — PROGRESS NOTES
Pastoral Care Progress Note    2020  Patient: Demetra Ayala : 1990  Admission Date & Time: 2020 0645  MRN: [id number] [facility]: [id number]                     Chaplaincy Interventions Utilized:   Empowerment: Provided chaplaincy education          Relationship Building: Cultivated a relationship of care and support    Chaplaincy Outcomes Achieved:  Declined  support             20 1200   Plan of Care   Care Plan Initiated Patient/family refused  involvement   Assessment Completed by: Unit visit

## 2020-06-29 NOTE — SOCIAL WORK
LOS: 2 day  Pt is not a documented bundle  Pt is not a 30 day readmission  CM informed that Pt will need Eliquis upon discharge  Call placed to Pt's [facility] pharmacy([phone]), pharmacist informed CM that Pt has $40 copay for Eliquis  Met with Pt  Pt presents AA&Ox3  Discussed role of  and discharge planning  Pt reports she lives with her  and 2 children [place], no steps to enter  Pt reports she is independent with adls and ambulation  Pt reports she uses [facility] pharmacy on [address] has prescription plan and is able to afford medications  Pt denies hx of VNA and has been to outpt PT in past  CM informed Pt of Eliquis cost upon discharge  Pt in agreement  CM informed Pt of $10 copay card for Eliquis  Pt in agreement for $10 copay card for Eliquis  Pt informed CM that her  will transport her home upon discharge  CM to follow

## 2020-06-29 NOTE — DISCHARGE SUMMARY
Discharge- Sushma Chaney 1990, 30 y o  female MRN: [id number]    Unit/Bed#: -01 Encounter: [id number]    Primary Care Provider: Yemi Sosa DO   Date and time admitted to hospital: 6/27/2020  6:45 AM        Pulmonary embolism [facility])  Assessment & Plan  PE, POA, a/e/b pleuritic chest pain and CT scan findings  Transitioned to Eliquis from heparin  To complete a 7 day course of 10mg Eliquis BID, then transition to 5mg BID x 3-6months  DC Oral contraceptives  To follow up with her PCP, discuss duration of anticoagulation and need for hypercoagulable workup  ECHO - normal systolic function  Dopplers - No DVT  Medically stable for discharge    * Chest pain  Assessment & Plan  Pleuritic chest pain secondary to PE  Lower rib pain and back pain resolved, still with left shoulder/apex pain  No longer requiring oxycodone or morphine  DC opiates  Place on scheduled tylenol  ECHO EF 15%, normal systolic function   Troponins x3 negative          Discharging Physician / Practitioner: Rubi Bowers MD  PCP: Yemi Sosa DO  Admission Date:   Admission Orders (From admission, onward)     Ordered        06/27/20 1037  Inpatient Admission  Once                   Discharge Date: 06/29/20    Resolved Problems  Date Reviewed: 6/28/2020    None          Consultations During Hospital Stay:  · None    Procedures Performed:   · none    Significant Findings / Test Results:   · 6/27/20 CT PE study - Small clot in the distal left pulmonary artery, extending into the segmental branches of the left lower lobe  The clot is peripherally located within the vessel, adjacent to the wall, and does not expand the vessels  No pulmonary infarct or right heart strain    · ECHO - normal systolic function, EF 37%  · Vascular dopplers - negative for clots    Incidental Findings:   · none     Test Results Pending at Discharge (will require follow up):   · none     Outpatient Tests Requested:  · none    Complications:  none    Reason for Admission: Pleuritic chest pain    Hospital Course:     Claudio Desai is a 30 y o  female patient who originally presented to the hospital on 6/27/2020 due to pleuritic chest pain and was found to have pulmonary embolism, she was subsequently treated with IV heparin and transitioned to Eliquis  ECHO showed EF 80%, grade 1 diastolic dysfunction,  She improved and is medically stable for discharge on eliquis for 3-6 months      Please see above list of diagnoses and related plan for additional information  Condition at Discharge: stable     Discharge Day Visit / Exam:     Subjective:  No overnight events, complains of left sided pleuritic chest pain, improved around her lower ribs and back but still prominent around her left shoulder  Pain is non-radiating, worse on movement or taking a deep breath (pleuritic), no recent trauma or bruise noted  Vitals: Blood Pressure: 132/83 (06/29/20 0801)  Pulse: 67 (06/29/20 0801)  Temperature: 97 9 °F (36 6 °C) (06/29/20 0801)  Temp Source: Oral (06/28/20 0454)  Respirations: 16 (06/29/20 0801)  Height: 5' 9" (175 3 cm) (06/27/20 4592)  Weight - Scale: 81 7 kg (180 lb 1 6 oz) (06/29/20 0600)  SpO2: 95 % (06/29/20 0801)  Exam:   Physical Exam   Constitutional: She is oriented to person, place, and time  She appears well-developed and well-nourished  No distress  Cardiovascular: Normal rate, regular rhythm, normal heart sounds and intact distal pulses  Exam reveals no gallop and no friction rub  No murmur heard  Pulmonary/Chest: Effort normal and breath sounds normal  No stridor  No respiratory distress  She has no wheezes  Abdominal: Soft  Bowel sounds are normal  She exhibits no distension  There is no tenderness  Musculoskeletal: Normal range of motion  She exhibits no edema, tenderness or deformity  Neurological: She is alert and oriented to person, place, and time  No cranial nerve deficit  Coordination normal    Skin: Skin is warm and dry   Capillary refill takes less than 2 seconds  She is not diaphoretic  No erythema  No pallor  Psychiatric: She has a normal mood and affect  Her behavior is normal  Judgment and thought content normal    Nursing note and vitals reviewed  Discussion with Family: Patient opted to update her  herself    Discharge instructions/Information to patient and family:   See after visit summary for information provided to patient and family  Provisions for Follow-Up Care:  See after visit summary for information related to follow-up care and any pertinent home health orders  Disposition:     Home    For Discharges to [facility] SNF:   · Not Applicable to this Patient - Not Applicable to this Patient    Planned Readmission: No     Discharge Statement:  I spent 45 minutes discharging the patient  This time was spent on the day of discharge  I had direct contact with the patient on the day of discharge  Greater than 50% of the total time was spent examining patient, answering all patient questions, arranging and discussing plan of care with patient as well as directly providing post-discharge instructions  Additional time then spent on discharge activities  Discharge Medications:  See after visit summary for reconciled discharge medications provided to patient and family        ** Please Note: This note has been constructed using a voice recognition system **

## 2020-06-29 NOTE — ASSESSMENT & PLAN NOTE
Pleuritic chest pain secondary to PE  Lower rib pain and back pain resolved, still with left shoulder/apex pain  No longer requiring oxycodone or morphine  DC opiates  Place on scheduled tylenol  ECHO EF 58%, normal systolic function   Troponins x3 negative

## 2020-06-29 NOTE — ASSESSMENT & PLAN NOTE
PE, POA, a/e/b pleuritic chest pain and CT scan findings  Transitioned to Eliquis from heparin  To complete a 7 day course of 10mg Eliquis BID, then transition to 5mg BID x 3-6months  DC OTC  To follow up with her PCP, discuss duration of anticoagulation and need for hypercoagulable workup  ECHO - normal systolic function  Dopplers - No DVT  Medically stable for discharge

## 2020-06-29 NOTE — ASSESSMENT & PLAN NOTE
PE, POA, a/e/b pleuritic chest pain and CT scan findings  Transitioned to Eliquis from heparin  To complete a 7 day course of 10mg Eliquis BID, then transition to 5mg BID x 3-6months  DC Oral contraceptives  To follow up with her PCP, discuss duration of anticoagulation and need for hypercoagulable workup  ECHO - normal systolic function  Dopplers - No DVT  Medically stable for discharge

## 2020-06-29 NOTE — PROGRESS NOTES
Pleasant woman  Resting comfortably  Oriented her to pastoral care  Pt declined  support  No needs expressed       06/29/20 1200   Plan of Care   Care Plan Initiated Patient/family refused  involvement   Assessment Completed by: Unit visit

## 2020-06-29 NOTE — ASSESSMENT & PLAN NOTE
Pleuritic chest pain secondary to PE  Lower rib pain and back pain resolved, still with left shoulder/apex pain  No longer requiring oxycodone or morphine  DC opiates  Place on scheduled tylenol  ECHO EF 15%, normal systolic function   Troponins x3 negative

## 2020-06-29 NOTE — DISCHARGE INSTRUCTIONS
Pulmonary Embolism   WHAT YOU NEED TO KNOW:   A pulmonary embolism (PE) is the sudden blockage of a blood vessel in the lungs by an embolus  An embolus is a small piece of blood clot, fat, air, or tumor cells  The embolus cuts off the blood supply to your lungs  A pulmonary embolism can become life-threatening  DISCHARGE INSTRUCTIONS:   Call 911 for any of the following:   · You feel lightheaded, short of breath, and have chest pain  · You cough up blood  · You have a seizure  · You have slurred speech, increased sleepiness, or problems seeing, talking, or thinking  · You have weakness or cannot move your arm or leg on one side of your body  Seek care immediately if:   · You feel faint  · You have a severe headache  · Your heart is beating faster than normal   Contact your healthcare provider if:   · The skin on any part of your legs or hips turns purple  · Your gums or nose bleed  · You see blood in your urine or bowel movements  · Your bowel movements are black or darker than normal     · You have questions or concerns about your condition or care  Medicines:   · Blood thinners  help treat the PE and prevent new clots from forming  Examples of blood thinners include heparin, rivaroxaban, apixiban, and warfarin  The following are general safety guidelines to follow while you are taking a blood thinner:     ¨ Watch for bleeding and bruising  Watch for bleeding from your gums or nose  Watch for blood in your urine and bowel movements  Use a soft washcloth on your skin, and a soft toothbrush to brush your teeth  This can keep your skin and gums from bleeding  If you shave, use an electric shaver  Do not play contact sports  ¨ Tell your dentist and other healthcare providers that you take a blood thinner  Wear a bracelet or necklace that says you take this medicine  ¨ Do not start or stop any medicines unless your healthcare provider tells you to   Many medicines cannot be used with blood thinners  ¨ Tell your healthcare provider right away if you forget to take the blood thinner , or if you take too much  ¨ Warfarin  is a blood thinner that you may need to take  The following are additional things you should be aware of if you take warfarin:    § Foods and medicines can affect the amount of warfarin in your blood  Do not make major changes to your diet  Warfarin works best when you eat about the same amount of vitamin K every day  Vitamin K is found in green leafy vegetables and certain other foods  Ask for more information about what to eat or not to eat  § You will need to see your healthcare provider for follow-up visits  You will need regular blood tests to decide how much warfarin you need  · Take your medicine as directed  Contact your healthcare provider if you think your medicine is not helping or if you have side effects  Tell him or her if you are allergic to any medicine  Keep a list of the medicines, vitamins, and herbs you take  Include the amounts, and when and why you take them  Bring the list or the pill bottles to follow-up visits  Carry your medicine list with you in case of an emergency  Prevent another PE:   · Wear pressure stockings  The stockings are tight and put pressure on your legs  This improves blood flow and helps prevent clots  Wear the stockings during the day  Do not wear them when you sleep  · Exercise regularly  Ask about the best exercise plan for you  When you travel by car or work at a desk, take breaks to stand up and move around as much as possible  Rotate your feet in circles often if you sit for a long period of time  · Maintain a healthy weight  Ask your healthcare provider how much you should weigh  Ask him to help you create a weight loss plan if you are overweight  · Do not smoke  Nicotine and other chemicals in cigarettes and cigars can damage blood vessels and increase your risk for another PE   Ask your healthcare provider for information if you currently smoke and need help to quit  E-cigarettes or smokeless tobacco still contain nicotine  Talk to your healthcare provider before you use these products  Follow up with your healthcare provider as directed:  Write down your questions so you remember to ask them during your visits  © 2017 [facility] Information is for End User's use only and may not be sold, redistributed or otherwise used for commercial purposes  All illustrations and images included in CareNotes® are the copyrighted property of [employer]  or Dion Mora  The above information is an  only  It is not intended as medical advice for individual conditions or treatments  Talk to your doctor, nurse or pharmacist before following any medical regimen to see if it is safe and effective for you

## 2020-06-29 NOTE — PLAN OF CARE
Problem: PAIN - ADULT  Goal: Verbalizes/displays adequate comfort level or baseline comfort level  Description  Interventions:  - Encourage patient to monitor pain and request assistance  - Assess pain using appropriate pain scale  - Administer analgesics based on type and severity of pain and evaluate response  - Implement non-pharmacological measures as appropriate and evaluate response  - Consider cultural and social influences on pain and pain management  - Notify physician/advanced practitioner if interventions unsuccessful or patient reports new pain  Outcome: Progressing     Problem: INFECTION - ADULT  Goal: Absence or prevention of progression during hospitalization  Description  INTERVENTIONS:  - Assess and monitor for signs and symptoms of infection  - Monitor lab/diagnostic results  - Monitor all insertion sites, i e  indwelling lines, tubes, and drains  - Monitor endotracheal if appropriate and nasal secretions for changes in amount and color  - [place] appropriate cooling/warming therapies per order  - Administer medications as ordered  - Instruct and encourage patient and family to use good hand hygiene technique  - Identify and instruct in appropriate isolation precautions for identified infection/condition  Outcome: Progressing     Problem: SAFETY ADULT  Goal: Patient will remain free of falls  Description  INTERVENTIONS:  - Assess patient frequently for physical needs  -  Identify cognitive and physical deficits and behaviors that affect risk of falls    -  [place] fall precautions as indicated by assessment   - Educate patient/family on patient safety including physical limitations  - Instruct patient to call for assistance with activity based on assessment  - Modify environment to reduce risk of injury  - Consider OT/PT consult to assist with strengthening/mobility  Outcome: Progressing  Goal: Maintain or return to baseline ADL function  Description  INTERVENTIONS:  -  Assess patient's ability to carry out ADLs; assess patient's baseline for ADL function and identify physical deficits which impact ability to perform ADLs (bathing, care of mouth/teeth, toileting, grooming, dressing, etc )  - Assess/evaluate cause of self-care deficits   - Assess range of motion  - Assess patient's mobility; develop plan if impaired  - Assess patient's need for assistive devices and provide as appropriate  - Encourage maximum independence but intervene and supervise when necessary  - Involve family in performance of ADLs  - Assess for home care needs following discharge   - Consider OT consult to assist with ADL evaluation and planning for discharge  - Provide patient education as appropriate  Outcome: Progressing  Goal: Maintain or return mobility status to optimal level  Description  INTERVENTIONS:  - Assess patient's baseline mobility status (ambulation, transfers, stairs, etc )    - Identify cognitive and physical deficits and behaviors that affect mobility  - Identify mobility aids required to assist with transfers and/or ambulation (gait belt, sit-to-stand, lift, walker, cane, etc )  - [place] fall precautions as indicated by assessment  - Record patient progress and toleration of activity level on Mobility SBAR; progress patient to next Phase/Stage  - Instruct patient to call for assistance with activity based on assessment  - Consider rehabilitation consult to assist with strengthening/weightbearing, etc   Outcome: Progressing     Problem: DISCHARGE PLANNING  Goal: Discharge to home or other facility with appropriate resources  Description  INTERVENTIONS:  - Identify barriers to discharge w/patient and caregiver  - Arrange for needed discharge resources and transportation as appropriate  - Identify discharge learning needs (meds, wound care, etc )  - Arrange for interpretive services to assist at discharge as needed  - Refer to Case Management Department for coordinating discharge planning if the patient needs post-hospital services based on physician/advanced practitioner order or complex needs related to functional status, cognitive ability, or social support system  Outcome: Progressing     Problem: Knowledge Deficit  Goal: Patient/family/caregiver demonstrates understanding of disease process, treatment plan, medications, and discharge instructions  Description  Complete learning assessment and assess knowledge base    Interventions:  - Provide teaching at level of understanding  - Provide teaching via preferred learning methods  Outcome: Progressing

## 2020-06-29 NOTE — PROGRESS NOTES
Patient continues with pain in L shoulder at intervals, when taking a very deep breath  No pain when when resting and breathing normally

## 2020-06-30 NOTE — UTILIZATION REVIEW
Notification of Discharge  This is a Notification of Discharge from our facility [address]  Please be advised that this patient has been discharge from our facility  Below you will find the admission and discharge date and time including the patients disposition  PRESENTATION DATE: 6/27/2020  6:45 AM  OBS ADMISSION DATE:   IP ADMISSION DATE: 6/27/20 1037   DISCHARGE DATE: 6/29/2020  4:46 PM  DISPOSITION: Home/Self Care Home/Self Care   Admission Orders listed below:  Admission Orders (From admission, onward)     Ordered        06/27/20 1037  Inpatient Admission  Once                   Please contact the UR Department if additional information is required to close this patient's authorization/case  Aida Lay  [facility] Utilization Review Department  Main: [phone] x carefully listen to the prompts  All voicemails are confidential   [email or web address]  Send all requests for admission clinical reviews, approved or denied determinations and any other requests to dedicated fax number below belonging to the campus where the patient is receiving treatment   List of dedicated fax numbers:  [address] DENIALS (Administrative/Medical Necessity) [phone]   [address] (Maternity/NICU/Pediatrics) [phone]   Lexi Ellison [phone]   Efrain Roysabel [phone]   Toy Diego [phone]   [address] [phone]   [facility]  [phone]   [address] [place] [address], [address]  [address] [address] [address] [facility] [phone]

## 2020-06-30 NOTE — UTILIZATION REVIEW
Initial Clinical Review    Admission: Date/Time/Statement:   Admission Orders (From admission, onward)     Ordered        06/27/20 1037  Inpatient Admission  Once                   Orders Placed This Encounter   Procedures    Inpatient Admission     Standing Status:   Standing     Number of Occurrences:   1     Order Specific Question:   Admitting Physician     Answer:   Radha Valadez     Order Specific Question:   Level of Care     Answer:   Med Surg [16]     Order Specific Question:   Estimated length of stay     Answer:   More than 2 Midnights     Order Specific Question:   Certification     Answer:   I certify that inpatient services are medically necessary for this patient for a duration of greater than two midnights  See H&P and MD Progress Notes for additional information about the patient's course of treatment  ED Arrival Information     Expected Arrival Acuity Means of Arrival Escorted By Service Admission Type    - 6/27/2020 06:38 Urgent Walk-In Self General Medicine Urgent    Arrival Complaint    Chest Pain        Chief Complaint   Patient presents with    Chest Pain     heartburn yesterday that feels "different" and hasn't gotten better  L sided CP that radiates to L shoulder, back and under L breast  states movement hurts  denies taking meds for pain  denies SOB  states it hurts to take a deep breath  Assessment/Plan: this is a 30year old female from home to ED admitted inpatient due to PE   on BCP  Presented with left sided chest pain radiating to left shoulder and neck starting 1700 on 6/26/2020  On exam is pallor  D dimer 1 59  CTA chest showed PE  Given asa and IV heparin in ED  Telemetry,  Anticoagulation with IV heparin, pain control in progress  On 6/28/2020 feels pain is worse, has used 2 doses IV morphine  Heparin transitioned to Eliquis  Pain control in progress       ED Triage Vitals [06/27/20 0653]   Temperature Pulse Respirations Blood Pressure SpO2   98 °F (36 7 °C) 94 18 159/95 97 %      Temp Source Heart Rate Source Patient Position - Orthostatic VS BP Location FiO2 (%)   Temporal Monitor Sitting Left arm --      Pain Score       6          Wt Readings from Last 1 Encounters:   06/29/20 81 7 kg (180 lb 1 6 oz)     Additional Vital Signs:   06/28/20 07:29:26  97 9 °F (36 6 °C)  61  18  117/81  93  96 %       06/28/20 04:54:34  98 1 °F (36 7 °C)  81  18  122/76  91  95 %  None (Room air)  Lying   06/27/20 22:26:03  98 4 °F (36 9 °C)  78  18  115/70  85  96 %  None (Room air)  Lying   06/27/20 19:19:15  98 1 °F (36 7 °C)  90  18  100/66  77  95 %       06/27/20 12:06:11  99 °F (37 2 °C)  69  17  119/77  91  98 %       06/27/20 1045    66  15      97 %       06/27/20 1030    67  15  118/68  87  99 %       06/27/20 1015    70  15      98 %       06/27/20 1000    71  20  132/77  98  97 %           Pertinent Labs/Diagnostic Test Results:   6/27/2020 CTA chest Clot in the distal left pulmonary artery extending into the segmental branches in the left lower lobe   The clot is peripherally located within the vessels, along the wall, and does not expand vessels, suggesting that it is subacute  Trace left pleural effusion  No pulmonary infarct or right heart strain    6/17/2020 CxR No acute cardiopulmonary disease    Results from last 7 days   Lab Units 06/27/20  1649   SARS-COV-2  Negative     Results from last 7 days   Lab Units 06/29/20  0443 06/28/20  0500 06/27/20  0702   WBC Thousand/uL 6 04 7 00 9 53   HEMOGLOBIN g/dL 13 9 13 8 14 8   HEMATOCRIT % 42 5 40 9 44 1   PLATELETS Thousands/uL 162 156 180   NEUTROS ABS Thousands/µL 3 00  --  5 80     Results from last 7 days   Lab Units 06/29/20  0443 06/28/20  0500 06/27/20  0702   SODIUM mmol/L 139 139 140   POTASSIUM mmol/L 3 5 3 7 3 8   CHLORIDE mmol/L 104 104 104   CO2 mmol/L 27 26 27   ANION GAP mmol/L 8 9 9   BUN mg/dL 10 12 14   CREATININE mg/dL 0 73 0 72 0 68   EGFR ml/min/1 73sq m 110 112 117 CALCIUM mg/dL 8 6 8 7 9 1     Results from last 7 days   Lab Units 06/29/20  0443 06/28/20  0500 06/27/20  0702   AST U/L 11 14 22   ALT U/L 13 14 17   ALK PHOS U/L 76 68 67   TOTAL PROTEIN g/dL 7 0 7 3 7 7   ALBUMIN g/dL 3 3* 3 4* 3 7   TOTAL BILIRUBIN mg/dL 0 40 0 60 0 90     Results from last 7 days   Lab Units 06/29/20  0443 06/28/20  0500 06/27/20  0702   GLUCOSE RANDOM mg/dL 90 92 81     Results from last 7 days   Lab Units 06/27/20  1021 06/27/20  0702   TROPONIN I ng/mL <0 02 <0 02     Results from last 7 days   Lab Units 06/27/20  0702   D-DIMER QUANTITATIVE ug/ml FEU 1 59*     Results from last 7 days   Lab Units 06/28/20  0930 06/28/20  0051 06/27/20  1716 06/27/20  0702   PROTIME seconds  --   --   --  14 7*   INR   --   --   --  1 18   PTT seconds 58* 93* 130* 31       ED Treatment:   Medication Administration from 06/27/2020 4440 to 06/27/2020 1200       Date/Time Order Dose Route Action Comments     06/27/2020 5301 aspirin chewable tablet 324 mg 324 mg Oral Given      06/27/2020 1107 heparin (porcine) injection 6,400 Units 6,400 Units Intravenous Given      06/27/2020 1107 heparin (porcine) 25,000 units in 250 mL infusion (premix) 18 Units/kg/hr Intravenous New Bag         Past Medical History:   Diagnosis Date    Depression      Present on Admission:  **None**      Admitting Diagnosis: Chest pain [R07 9]  EKG abnormality [R94 31]  Acute pulmonary embolism ([facility] [address] ) [I26 99]  Age/Sex: 30 y o  female  Admission Orders: 6/27/2020 1037 inpatient   Scheduled Medications:  Medications:  apixaban 10 mg Oral BID     Continuous IV Infusions:h eparin (porcine) 25,000 units in 250 mL infusion (premix)   Rate: 2 4-24 mL/hr Dose: 3-30 Units/kg/hr  Weight Dosing Info: 80 kg (Order-Specific)  Freq: Titrated Route: IV  Last Dose: Stopped (06/28/20 1042)  Start: 06/27/20 1100 End: 06/28/20 0928    No current facility-administered medications for this encounter     PRN Meds:  HYDROmorphone 0 5 mg Intravenous Q6H PRN morphine injection -used  X 2 (3249, 7909) 2 mg Intravenous Q4H PRN   oxyCODONE-acetaminophen - used x 1  1 tablet Oral Q4H PRN         Network Utilization Review Department  [email or web address]  ATTENTION: Please call with any questions or concerns to [phone] and carefully listen to the prompts so that you are directed to the right person  All voicemails are confidential   [facility] all requests for admission clinical reviews, approved or denied determinations and any other requests to dedicated fax number below belonging to the campus where the patient is receiving treatment   List of dedicated fax numbers for the Facilities:  [address] DENIALS (Administrative/Medical Necessity) [phone]   [address] (Maternity/NICU/Pediatrics) [phone]   Radha Dubon [phone]   Rosalva Chavez [phone]   Cullen Opitz [phone]   [address] [facility]  [phone]   [address] [place] [address] [address] [place] [address] [phone]   [facility]  [phone]   [address] [place] [address], [address]  [address] [facility] 1000 [address] [facility] [phone]

## 2020-07-07 DIAGNOSIS — I26.99 ACUTE PULMONARY EMBOLISM (HCC): ICD-10-CM

## 2020-07-13 RX ORDER — APIXABAN 5 MG/1
TABLET, FILM COATED ORAL
Qty: 24 TABLET | Refills: 0 | OUTPATIENT
Start: 2020-07-13

## 2021-01-13 ENCOUNTER — IMMUNIZATIONS (OUTPATIENT)
Dept: FAMILY MEDICINE CLINIC | Facility: HOSPITAL | Age: 31
End: 2021-01-13

## 2021-01-13 DIAGNOSIS — Z23 ENCOUNTER FOR IMMUNIZATION: ICD-10-CM

## 2021-01-13 PROCEDURE — 91301 SARS-COV-2 / COVID-19 MRNA VACCINE (MODERNA) 100 MCG: CPT

## 2021-01-13 PROCEDURE — 0011A SARS-COV-2 / COVID-19 MRNA VACCINE (MODERNA) 100 MCG: CPT

## 2021-02-10 ENCOUNTER — IMMUNIZATIONS (OUTPATIENT)
Dept: FAMILY MEDICINE CLINIC | Facility: HOSPITAL | Age: 31
End: 2021-02-10

## 2021-02-10 DIAGNOSIS — Z23 ENCOUNTER FOR IMMUNIZATION: Primary | ICD-10-CM

## 2021-02-10 PROCEDURE — 91301 SARS-COV-2 / COVID-19 MRNA VACCINE (MODERNA) 100 MCG: CPT

## 2021-02-10 PROCEDURE — 0012A SARS-COV-2 / COVID-19 MRNA VACCINE (MODERNA) 100 MCG: CPT

## 2023-02-15 ENCOUNTER — APPOINTMENT (OUTPATIENT)
Dept: RADIOLOGY | Facility: HOSPITAL | Age: 33
End: 2023-02-15

## 2023-02-15 ENCOUNTER — APPOINTMENT (EMERGENCY)
Dept: CT IMAGING | Facility: HOSPITAL | Age: 33
End: 2023-02-15

## 2023-02-15 ENCOUNTER — HOSPITAL ENCOUNTER (EMERGENCY)
Facility: HOSPITAL | Age: 33
Discharge: HOME | End: 2023-02-15
Attending: EMERGENCY MEDICINE | Admitting: EMERGENCY MEDICINE

## 2023-02-15 ENCOUNTER — OFFICE VISIT (OUTPATIENT)
Dept: URGENT CARE | Facility: CLINIC | Age: 33
End: 2023-02-15

## 2023-02-15 VITALS
HEIGHT: 69 IN | HEART RATE: 91 BPM | OXYGEN SATURATION: 96 % | DIASTOLIC BLOOD PRESSURE: 80 MMHG | BODY MASS INDEX: 29.62 KG/M2 | RESPIRATION RATE: 18 BRPM | TEMPERATURE: 98.1 F | SYSTOLIC BLOOD PRESSURE: 130 MMHG | WEIGHT: 200 LBS

## 2023-02-15 VITALS
OXYGEN SATURATION: 96 % | HEART RATE: 106 BPM | TEMPERATURE: 98.6 F | RESPIRATION RATE: 16 BRPM | WEIGHT: 200 LBS | DIASTOLIC BLOOD PRESSURE: 78 MMHG | HEIGHT: 69 IN | BODY MASS INDEX: 29.62 KG/M2 | SYSTOLIC BLOOD PRESSURE: 122 MMHG

## 2023-02-15 DIAGNOSIS — J06.9 URI (UPPER RESPIRATORY INFECTION): Primary | ICD-10-CM

## 2023-02-15 DIAGNOSIS — J01.90 ACUTE NON-RECURRENT SINUSITIS, UNSPECIFIED LOCATION: ICD-10-CM

## 2023-02-15 DIAGNOSIS — R06.2 WHEEZING: ICD-10-CM

## 2023-02-15 DIAGNOSIS — R05.1 ACUTE COUGH: Primary | ICD-10-CM

## 2023-02-15 LAB
ALBUMIN SERPL BCP-MCNC: 4.1 G/DL (ref 3.5–5)
ALP SERPL-CCNC: 72 U/L (ref 34–104)
ALT SERPL W P-5'-P-CCNC: 9 U/L (ref 7–52)
ANION GAP SERPL CALCULATED.3IONS-SCNC: 7 MMOL/L (ref 4–13)
AST SERPL W P-5'-P-CCNC: 15 U/L (ref 13–39)
ATRIAL RATE: 87 BPM
BASOPHILS # BLD AUTO: 0.04 THOUSANDS/ÂΜL (ref 0–0.1)
BASOPHILS NFR BLD AUTO: 0 % (ref 0–1)
BILIRUB SERPL-MCNC: 1.16 MG/DL (ref 0.2–1)
BUN SERPL-MCNC: 14 MG/DL (ref 5–25)
CALCIUM SERPL-MCNC: 8.7 MG/DL (ref 8.4–10.2)
CARDIAC TROPONIN I PNL SERPL HS: <2 NG/L
CARDIAC TROPONIN I PNL SERPL HS: <2 NG/L
CHLORIDE SERPL-SCNC: 104 MMOL/L (ref 96–108)
CO2 SERPL-SCNC: 27 MMOL/L (ref 21–32)
CREAT SERPL-MCNC: 0.73 MG/DL (ref 0.6–1.3)
D DIMER PPP FEU-MCNC: 2.25 UG/ML FEU
EOSINOPHIL # BLD AUTO: 0.52 THOUSAND/ÂΜL (ref 0–0.61)
EOSINOPHIL NFR BLD AUTO: 6 % (ref 0–6)
ERYTHROCYTE [DISTWIDTH] IN BLOOD BY AUTOMATED COUNT: 13.2 % (ref 11.6–15.1)
EXT PREGNANCY TEST URINE: NEGATIVE
EXT. CONTROL: NORMAL
GFR SERPL CREATININE-BSD FRML MDRD: 107 ML/MIN/1.73SQ M
GLUCOSE SERPL-MCNC: 79 MG/DL (ref 65–140)
HCT VFR BLD AUTO: 40.6 % (ref 34.8–46.1)
HGB BLD-MCNC: 13.4 G/DL (ref 11.5–15.4)
IMM GRANULOCYTES # BLD AUTO: 0.02 THOUSAND/UL (ref 0–0.2)
IMM GRANULOCYTES NFR BLD AUTO: 0 % (ref 0–2)
LYMPHOCYTES # BLD AUTO: 1.74 THOUSANDS/ÂΜL (ref 0.6–4.47)
LYMPHOCYTES NFR BLD AUTO: 19 % (ref 14–44)
MCH RBC QN AUTO: 28.2 PG (ref 26.8–34.3)
MCHC RBC AUTO-ENTMCNC: 33 G/DL (ref 31.4–37.4)
MCV RBC AUTO: 85 FL (ref 82–98)
MONOCYTES # BLD AUTO: 0.56 THOUSAND/ÂΜL (ref 0.17–1.22)
MONOCYTES NFR BLD AUTO: 6 % (ref 4–12)
NEUTROPHILS # BLD AUTO: 6.25 THOUSANDS/ÂΜL (ref 1.85–7.62)
NEUTS SEG NFR BLD AUTO: 69 % (ref 43–75)
NRBC BLD AUTO-RTO: 0 /100 WBCS
P AXIS: 71 DEGREES
PLATELET # BLD AUTO: 169 THOUSANDS/UL (ref 149–390)
PMV BLD AUTO: 10.6 FL (ref 8.9–12.7)
POTASSIUM SERPL-SCNC: 3.6 MMOL/L (ref 3.5–5.3)
PR INTERVAL: 124 MS
PROT SERPL-MCNC: 7.6 G/DL (ref 6.4–8.4)
QRS AXIS: 77 DEGREES
QRSD INTERVAL: 96 MS
QT INTERVAL: 366 MS
QTC INTERVAL: 440 MS
RBC # BLD AUTO: 4.76 MILLION/UL (ref 3.81–5.12)
SARS-COV-2 AG UPPER RESP QL IA: NEGATIVE
SODIUM SERPL-SCNC: 138 MMOL/L (ref 135–147)
T WAVE AXIS: 48 DEGREES
VALID CONTROL: NORMAL
VENTRICULAR RATE: 87 BPM
WBC # BLD AUTO: 9.13 THOUSAND/UL (ref 4.31–10.16)

## 2023-02-15 RX ORDER — ALBUTEROL SULFATE 90 UG/1
2 AEROSOL, METERED RESPIRATORY (INHALATION) EVERY 6 HOURS PRN
Qty: 8.5 G | Refills: 0 | Status: SHIPPED | OUTPATIENT
Start: 2023-02-15

## 2023-02-15 RX ORDER — AZITHROMYCIN 250 MG/1
TABLET, FILM COATED ORAL
Qty: 6 TABLET | Refills: 0 | Status: SHIPPED | OUTPATIENT
Start: 2023-02-15 | End: 2023-02-19

## 2023-02-15 RX ADMIN — IOHEXOL 100 ML: 350 INJECTION, SOLUTION INTRAVENOUS at 13:14

## 2023-02-15 RX ADMIN — SODIUM CHLORIDE 1000 ML: 0.9 INJECTION, SOLUTION INTRAVENOUS at 12:36

## 2023-02-15 NOTE — ED PROVIDER NOTES
History  Chief Complaint   Patient presents with   • Shortness of Breath     From urgent care for sinus infection, "racing heart", and SOB  States sob since yesterday  States hx of PE  Pt is speaking in full, clear sentences  Denies CP  Denies meds for pain     Patient is a 77-year-old white female with history of PE (2020) reports onset 2 days ago of raspy cough, sore throat, cough productive of green sputum, sinus pressure  States she has felt her heart racing and shortness  of breath going up steps  Went to the urgent care today  Given her history of PE, urgent care felt she needed to be evaluated further in the emergency room  She is not a smoker  Denies illicit drug use  She did take a 4-hour flight 3 weeks ago  No calf pain or leg swelling  No illicit drug use or alcohol  She was prescribed a Z-Jose by the urgent care for sinus infection  She had a COVID test at the care which was negative  She had a negative COVID test at the urgent care          Prior to Admission Medications   Prescriptions Last Dose Informant Patient Reported? Taking?    albuterol (ProAir HFA) 90 mcg/act inhaler   No No   Sig: Inhale 2 puffs every 6 (six) hours as needed for wheezing   apixaban (ELIQUIS) 5 mg   No No   Sig: Take 1 tablet (5 mg total) by mouth 2 (two) times a day   azithromycin (ZITHROMAX) 250 mg tablet   No No   Sig: Take 2 tablets today then 1 tablet daily x 4 days   fexofenadine (ALLEGRA) 60 MG tablet   Yes No   Sig: Take 60 mg by mouth daily   Patient not taking: Reported on 2/15/2023   sertraline (ZOLOFT) 100 mg tablet   Yes No   Sig: Take 100 mg by mouth daily at bedtime      Facility-Administered Medications: None       Past Medical History:   Diagnosis Date   • Depression    • Pulmonary embolism (HCC)        Past Surgical History:   Procedure Laterality Date   • DILATION AND CURETTAGE OF UTERUS         Family History   Problem Relation Age of Onset   • Cancer Mother    • Hyperlipidemia Father      I have reviewed and agree with the history as documented  E-Cigarette/Vaping   • E-Cigarette Use Never User      E-Cigarette/Vaping Substances     Social History     Tobacco Use   • Smoking status: Never   • Smokeless tobacco: Never   Vaping Use   • Vaping Use: Never used   Substance Use Topics   • Alcohol use: Never   • Drug use: Never       Review of Systems   Constitutional: Negative for chills and fever  HENT: Positive for sinus pressure, sinus pain and sore throat  Negative for ear pain  Respiratory: Positive for cough and shortness of breath  Cardiovascular: Negative for chest pain and palpitations  Gastrointestinal: Negative for abdominal pain, nausea and vomiting  Genitourinary: Negative for dysuria and hematuria  Musculoskeletal: Negative for arthralgias and back pain  Skin: Negative for color change and rash  All other systems reviewed and are negative  Physical Exam  Physical Exam  Vitals and nursing note reviewed  Constitutional:       General: She is not in acute distress  Appearance: She is well-developed  She is not ill-appearing, toxic-appearing or diaphoretic  HENT:      Head: Normocephalic and atraumatic  Mouth/Throat:      Mouth: Mucous membranes are moist       Pharynx: Oropharynx is clear  Eyes:      Extraocular Movements: Extraocular movements intact  Pupils: Pupils are equal, round, and reactive to light  Cardiovascular:      Rate and Rhythm: Regular rhythm  Tachycardia present  Pulmonary:      Effort: Pulmonary effort is normal       Comments: Scant wheeze bilaterally  Abdominal:      General: Bowel sounds are normal       Palpations: Abdomen is soft  Musculoskeletal:         General: Normal range of motion  Cervical back: Normal range of motion and neck supple  Skin:     General: Skin is warm and dry  Capillary Refill: Capillary refill takes less than 2 seconds  Neurological:      General: No focal deficit present        Mental Status: She is alert and oriented to person, place, and time           Vital Signs  ED Triage Vitals [02/15/23 1039]   Temperature Pulse Respirations Blood Pressure SpO2   98 1 °F (36 7 °C) 102 18 128/77 97 %      Temp Source Heart Rate Source Patient Position - Orthostatic VS BP Location FiO2 (%)   Temporal Monitor Sitting Left arm --      Pain Score       3           Vitals:    02/15/23 1039 02/15/23 1158 02/15/23 1245 02/15/23 1345   BP: 128/77 137/81 135/83 130/80   Pulse: 102 87 76 91   Patient Position - Orthostatic VS: Sitting Lying Lying Lying         Visual Acuity  Visual Acuity    Flowsheet Row Most Recent Value   L Pupil Size (mm) 3   R Pupil Size (mm) 3          ED Medications  Medications   sodium chloride 0 9 % bolus 1,000 mL (0 mL Intravenous Stopped 2/15/23 1430)   iohexol (OMNIPAQUE) 350 MG/ML injection (SINGLE-DOSE) 100 mL (100 mL Intravenous Given 2/15/23 1314)       Diagnostic Studies  Results Reviewed     Procedure Component Value Units Date/Time    HS Troponin I 2hr [[id number]] Collected: 02/15/23 1236    Lab Status: Final result Specimen: Blood from Arm, Right Updated: 02/15/23 1315     hs TnI 2hr <2 ng/L      Delta 2hr hsTnI --    POCT pregnancy, urine [[id number]]  (Normal) Resulted: 02/15/23 1238    Lab Status: Final result Updated: 02/15/23 1239     EXT Preg Test, Ur Negative     Control Valid    HS Troponin 0hr (reflex protocol) [[id number]]  (Normal) Collected: 02/15/23 1041    Lab Status: Final result Specimen: Blood from Arm, Right Updated: 02/15/23 1144     hs TnI 0hr <2 ng/L     D-dimer, quantitative [[id number]]  (Abnormal) Collected: 02/15/23 1041    Lab Status: Final result Specimen: Blood from Arm, Right Updated: 02/15/23 1128     D-Dimer, Quant 2 25 ug/ml FEU     Comprehensive metabolic panel [[id number]]  (Abnormal) Collected: 02/15/23 1041    Lab Status: Final result Specimen: Blood from Arm, Right Updated: 02/15/23 1117     Sodium 138 mmol/L      Potassium 3 6 mmol/L      Chloride 104 mmol/L      CO2 27 mmol/L      ANION GAP 7 mmol/L      BUN 14 mg/dL      Creatinine 0 73 mg/dL      Glucose 79 mg/dL      Calcium 8 7 mg/dL      AST 15 U/L      ALT 9 U/L      Alkaline Phosphatase 72 U/L      Total Protein 7 6 g/dL      Albumin 4 1 g/dL      Total Bilirubin 1 16 mg/dL      eGFR 107 ml/min/1 73sq m     Narrative:      National Kidney Disease Foundation guidelines for Chronic Kidney Disease (CKD):   •  Stage 1 with normal or high GFR (GFR > 90 mL/min/1 73 square meters)  •  Stage 2 Mild CKD (GFR = 60-89 mL/min/1 73 square meters)  •  Stage 3A Moderate CKD (GFR = 45-59 mL/min/1 73 square meters)  •  Stage 3B Moderate CKD (GFR = 30-44 mL/min/1 73 square meters)  •  Stage 4 Severe CKD (GFR = 15-29 mL/min/1 73 square meters)  •  Stage 5 End Stage CKD (GFR <15 mL/min/1 73 square meters)  Note: GFR calculation is accurate only with a steady state creatinine    CBC and differential [[id number]] Collected: 02/15/23 1041    Lab Status: Final result Specimen: Blood from Arm, Right Updated: 02/15/23 1050     WBC 9 13 Thousand/uL      RBC 4 76 Million/uL      Hemoglobin 13 4 g/dL      Hematocrit 40 6 %      MCV 85 fL      MCH 28 2 pg      MCHC 33 0 g/dL      RDW 13 2 %      MPV 10 6 fL      Platelets 841 Thousands/uL      nRBC 0 /100 WBCs      Neutrophils Relative 69 %      Immat GRANS % 0 %      Lymphocytes Relative 19 %      Monocytes Relative 6 %      Eosinophils Relative 6 %      Basophils Relative 0 %      Neutrophils Absolute 6 25 Thousands/µL      Immature Grans Absolute 0 02 Thousand/uL      Lymphocytes Absolute 1 74 Thousands/µL      Monocytes Absolute 0 56 Thousand/µL      Eosinophils Absolute 0 52 Thousand/µL      Basophils Absolute 0 04 Thousands/µL                  CTA ED chest PE study   Final Result by Jenn Quezada MD (02/15 1421)      No evidence for pulmonary embolism                    Workstation performed: [id number]         XR chest 2 views    (Results Pending) Procedures  Procedures         ED Course                               SBIRT 20yo+    Flowsheet Row Most Recent Value   SBIRT (23 yo +)    In order to provide better care to our patients, we are screening all of our patients for alcohol and drug use  Would it be okay to ask you these screening questions? Yes Filed at: 02/15/2023 1200   Initial Alcohol Screen: US AUDIT-C     1  How often do you have a drink containing alcohol? 0 Filed at: 02/15/2023 1200   2  How many drinks containing alcohol do you have on a typical day you are drinking? 0 Filed at: 02/15/2023 1200   3a  Male UNDER 65: How often do you have five or more drinks on one occasion? 0 Filed at: 02/15/2023 1200   3b  FEMALE Any Age, or MALE 65+: How often do you have 4 or more drinks on one occassion? 0 Filed at: 02/15/2023 1200   Audit-C Score 0 Filed at: 02/15/2023 1200   LIS: How many times in the past year have you    Used an illegal drug or used a prescription medication for non-medical reasons? Never Filed at: 02/15/2023 1200                    Medical Decision Making  75-year-old white female presenting with signs and symptoms consistent with an upper respiratory infection  She was prescribed Zithromax and albuterol inhaler by the urgent care  Her D-dimer came back elevated therefore CT PE study was ordered  This showed no pulmonary embolism  She was advised to follow-up with her primary care provider if no improvement next 2 to 3 days  Return precautions given  URI (upper respiratory infection): acute illness or injury  Amount and/or Complexity of Data Reviewed  Labs: ordered  Radiology: ordered  Risk  Prescription drug management            Disposition  Final diagnoses:   URI (upper respiratory infection)     Time reflects when diagnosis was documented in both MDM as applicable and the Disposition within this note     Time User Action Codes Description Comment    2/15/2023  2:27 PM Adonay Jiang Add [J06 9] URI (upper respiratory infection)       ED Disposition     ED Disposition   Discharge    Condition   Stable    Date/Time   Wed Feb 15, 2023  2:26 PM    Comment   Jake Vela discharge to home/self care  Follow-up Information     Follow up With Specialties Details Why Contact Info    Wei Messina DO Family Medicine   P O  [address] [facility]            Discharge Medication List as of 2/15/2023  2:28 PM      CONTINUE these medications which have NOT CHANGED    Details   albuterol (ProAir HFA) 90 mcg/act inhaler Inhale 2 puffs every 6 (six) hours as needed for wheezing, Starting Wed 2/15/2023, Normal      apixaban (ELIQUIS) 5 mg Take 1 tablet (5 mg total) by mouth 2 (two) times a day, Starting Mon 6/29/2020, Until Wed 7/29/2020, Print      azithromycin (ZITHROMAX) 250 mg tablet Take 2 tablets today then 1 tablet daily x 4 days, Normal      fexofenadine (ALLEGRA) 60 MG tablet Take 60 mg by mouth daily, Historical Med      sertraline (ZOLOFT) 100 mg tablet Take 100 mg by mouth daily at bedtime, Historical Med             No discharge procedures on file      PDMP Review     None          ED Provider  Electronically Signed by           Edward Wade PA-C  02/15/23 8745

## 2023-02-15 NOTE — PROGRESS NOTES
[address] [facility]        NAME: David León is a 27 y o  female  : 1990    MRN: [id number]  DATE: February 15, 2023  TIME: 10:05 AM    Assessment and Plan   Acute cough [R05 1]  1  Acute cough  azithromycin (ZITHROMAX) 250 mg tablet    Poct Covid 19 Rapid Antigen Test    albuterol (ProAir HFA) 90 mcg/act inhaler      2  Acute non-recurrent sinusitis, unspecified location  azithromycin (ZITHROMAX) 250 mg tablet      3  Wheezing  Poct Covid 19 Rapid Antigen Test    albuterol (ProAir HFA) 90 mcg/act inhaler        Rapid COVID 19- Negative    Patient Instructions       Patient was educated on acute cough and sinus infection  Patient was placed on antibiotics  Patient was told to eat on antibiotics  Patient was told if she has any chest pain or shortness of breath she should go to ED due to history of PE  Chief Complaint     Chief Complaint   Patient presents with   • Cold Like Symptoms     Pt reports on Monday she developed a sore throat, congestion, cough and intermittent wheezing  Yesterday developed sinus pressure, ear pressure and chills  Taking OTC cold/sinus meds  History of Present Illness       Patient is here today complaining of cough, head pressure and wheezing  Patient reports symptoms started a few day ago  Patient admits history of PE that was linked to birth control  Patient reports she was on eliquis but patient is currently not on any anticoagulation  PE occurred in   Admits history of exercise induced asthma  Denies any history of diabetes  Admits allergy to Penicillin and Keflex      Review of Systems   Review of Systems   HENT: Positive for congestion and sore throat  Respiratory: Positive for cough and wheezing  Neurological:        Head pressure   Psychiatric/Behavioral: Negative            Current Medications       Current Outpatient Medications:   •  albuterol (ProAir HFA) 90 mcg/act inhaler, Inhale 2 puffs every 6 (six) hours as needed for wheezing, Disp: 8 5 g, Rfl: 0  •  azithromycin (ZITHROMAX) 250 mg tablet, Take 2 tablets today then 1 tablet daily x 4 days, Disp: 6 tablet, Rfl: 0  •  sertraline (ZOLOFT) 100 mg tablet, Take 100 mg by mouth daily at bedtime, Disp: , Rfl:   •  apixaban (ELIQUIS) 5 mg, Take 1 tablet (5 mg total) by mouth 2 (two) times a day, Disp: 60 tablet, Rfl: 0  •  fexofenadine (ALLEGRA) 60 MG tablet, Take 60 mg by mouth daily (Patient not taking: Reported on 2/15/2023), Disp: , Rfl:     Current Allergies     Allergies as of 02/15/2023 - Reviewed 02/15/2023   Allergen Reaction Noted   • Cephalexin  05/22/2015   • Penicillins  05/22/2015            The following portions of the patient's history were reviewed and updated as appropriate: allergies, current medications, past family history, past medical history, past social history, past surgical history and problem list      Past Medical History:   Diagnosis Date   • Depression    • Pulmonary embolism ([facility] [address] )        Past Surgical History:   Procedure Laterality Date   • DILATION AND CURETTAGE OF UTERUS         Family History   Problem Relation Age of Onset   • Cancer Mother    • Hyperlipidemia Father          Medications have been verified  Objective   /78   Pulse (!) 106   Temp 98 6 °F (37 °C)   Resp 16   Ht 5' 9" (1 753 m)   Wt 90 7 kg (200 lb)   SpO2 96%   BMI 29 53 kg/m²   No LMP recorded  Physical Exam     Physical Exam  Vitals and nursing note reviewed  Constitutional:       Appearance: Normal appearance  HENT:      Head: Normocephalic  Comments: Pressure over frontal and maxillary sinus     Right Ear: Tympanic membrane, ear canal and external ear normal       Left Ear: Tympanic membrane, ear canal and external ear normal       Nose: Nose normal       Mouth/Throat:      Mouth: Mucous membranes are moist       Pharynx: No oropharyngeal exudate  Eyes:      Pupils: Pupils are equal, round, and reactive to light     Cardiovascular:      Rate and Rhythm: Normal rate and regular rhythm  Heart sounds: Normal heart sounds  Pulmonary:      Breath sounds: Wheezing present  Neurological:      General: No focal deficit present  Mental Status: She is alert and oriented to person, place, and time     Psychiatric:         Mood and Affect: Mood normal          Behavior: Behavior normal

## 2023-02-15 NOTE — PATIENT INSTRUCTIONS
Patient was educated on acute cough and sinus infection  Patient was placed on antibiotics  Patient was told to eat on antibiotics  Patient was told if she has any chest pain or shortness of breath she should go to ED due to history of PE  Sinusitis   WHAT YOU NEED TO KNOW:   Sinusitis is inflammation or infection of your sinuses  Sinusitis is most often caused by a virus  Acute sinusitis may last up to 12 weeks  Chronic sinusitis lasts longer than 12 weeks  Recurrent sinusitis means you have 4 or more infections in 1 year  DISCHARGE INSTRUCTIONS:   Return to the emergency department if:   You have trouble breathing or wheezing that is getting worse  You have a stiff neck, a fever, or a bad headache  You cannot open your eye  Your eyeball bulges out or you cannot move your eye  You are more sleepy than normal, or you notice changes in your ability to think, move, or talk  You have swelling of your forehead or scalp  Call your doctor if:   You have vision changes, such as double vision  Your eye and eyelid are red, swollen, and painful  Your symptoms do not improve or go away after 10 days  You have nausea and are vomiting  Your nose is bleeding  You have questions or concerns about your condition or care  Medicines: Your symptoms may go away on their own  Your healthcare provider may recommend watchful waiting for up to 10 days before starting antibiotics  You may need any of the following:  Acetaminophen  decreases pain and fever  It is available without a doctor's order  Ask how much to take and how often to take it  Follow directions  Read the labels of all other medicines you are using to see if they also contain acetaminophen, or ask your doctor or pharmacist  Acetaminophen can cause liver damage if not taken correctly  Do not use more than 4 grams (4,000 milligrams) total of acetaminophen in one day       NSAIDs , such as ibuprofen, help decrease swelling, pain, and fever  This medicine is available with or without a doctor's order  NSAIDs can cause stomach bleeding or kidney problems in certain people  If you take blood thinner medicine, always ask your healthcare provider if NSAIDs are safe for you  Always read the medicine label and follow directions  Nasal steroid sprays  may help decrease inflammation in your nose and sinuses  Decongestants  help reduce swelling and drain mucus in the nose and sinuses  They may help you breathe easier  Antihistamines  help dry mucus in the nose and relieve sneezing  Antibiotics  help treat or prevent a bacterial infection  Take your medicine as directed  Contact your healthcare provider if you think your medicine is not helping or if you have side effects  Tell him or her if you are allergic to any medicine  Keep a list of the medicines, vitamins, and herbs you take  Include the amounts, and when and why you take them  Bring the list or the pill bottles to follow-up visits  Carry your medicine list with you in case of an emergency  Self-care:   Rinse your sinuses as directed  Use a sinus rinse device to rinse your nasal passages with a saline (salt water) solution or distilled water  Do not use tap water  This will help thin the mucus in your nose and rinse away pollen and dirt  It will also help reduce swelling so you can breathe normally  Use a humidifier  to increase air moisture in your home  This may make it easier for you to breathe and help decrease your cough  Sleep with your head elevated  Place an extra pillow under your head before you go to sleep to help your sinuses drain  Drink liquids as directed  Ask your healthcare provider how much liquid to drink each day and which liquids are best for you  Liquids will thin the mucus in your nose and help it drain  Avoid drinks that contain alcohol or caffeine  Do not smoke, and avoid secondhand smoke    Nicotine and other chemicals in cigarettes and cigars can make your symptoms worse  Ask your healthcare provider for information if you currently smoke and need help to quit  E-cigarettes or smokeless tobacco still contain nicotine  Talk to your healthcare provider before you use these products  Prevent the spread of germs:   Wash your hands often with soap and water  Wash your hands after you use the bathroom, change a child's diaper, or sneeze  Wash your hands before you prepare or eat food  Stay away from people who are sick  Some germs spread easily and quickly through contact  Follow up with your doctor as directed: You may be referred to an ear, nose, and throat specialist  Write down your questions so you remember to ask them during your visits  © Copyright [employer] 2022 Information is for End User's use only and may not be sold, redistributed or otherwise used for commercial purposes  All illustrations and images included in CareNotes® are the copyrighted property of [employer] D [employer]  or [employer]   The above information is an  only  It is not intended as medical advice for individual conditions or treatments  Talk to your doctor, nurse or pharmacist before following any medical regimen to see if it is safe and effective for you

## 2023-02-15 NOTE — DISCHARGE INSTRUCTIONS
Follow up with your primary care provider if no improvement next 3-5 days    Zithromax as prescribed by your primary care provider    Return to ED for fever, new or worsening symptoms

## 2024-11-22 ENCOUNTER — HOSPITAL ENCOUNTER (OUTPATIENT)
Dept: NON INVASIVE DIAGNOSTICS | Age: 34
Discharge: HOME | End: 2024-11-22
Payer: COMMERCIAL

## 2024-11-22 DIAGNOSIS — R00.1 BRADYCARDIA: ICD-10-CM

## 2024-11-22 PROCEDURE — 93226 XTRNL ECG REC<48 HR SCAN A/R: CPT

## 2024-11-22 PROCEDURE — 93225 XTRNL ECG REC<48 HRS REC: CPT

## 2025-06-29 ENCOUNTER — HOSPITAL ENCOUNTER (EMERGENCY)
Facility: HOSPITAL | Age: 35
Discharge: HOME | End: 2025-06-29
Attending: EMERGENCY MEDICINE | Admitting: EMERGENCY MEDICINE
Payer: COMMERCIAL

## 2025-06-29 ENCOUNTER — APPOINTMENT (EMERGENCY)
Dept: RADIOLOGY | Facility: HOSPITAL | Age: 35
End: 2025-06-29
Payer: COMMERCIAL

## 2025-06-29 ENCOUNTER — APPOINTMENT (EMERGENCY)
Dept: CT IMAGING | Facility: HOSPITAL | Age: 35
End: 2025-06-29
Payer: COMMERCIAL

## 2025-06-29 VITALS
RESPIRATION RATE: 18 BRPM | OXYGEN SATURATION: 99 % | TEMPERATURE: 98.7 F | HEART RATE: 69 BPM | SYSTOLIC BLOOD PRESSURE: 104 MMHG | WEIGHT: 174.82 LBS | DIASTOLIC BLOOD PRESSURE: 59 MMHG | HEIGHT: 69 IN | BODY MASS INDEX: 25.89 KG/M2

## 2025-06-29 DIAGNOSIS — R00.2 PALPITATIONS: Primary | ICD-10-CM

## 2025-06-29 LAB
ANION GAP SERPL CALCULATED.3IONS-SCNC: 6 MMOL/L (ref 4–13)
BASOPHILS # BLD AUTO: 0.05 THOUSANDS/ÂΜL (ref 0–0.1)
BASOPHILS NFR BLD AUTO: 1 % (ref 0–1)
BUN SERPL-MCNC: 16 MG/DL (ref 5–25)
CALCIUM SERPL-MCNC: 9.1 MG/DL (ref 8.4–10.2)
CARDIAC TROPONIN I PNL SERPL HS: <2 NG/L (ref ?–50)
CHLORIDE SERPL-SCNC: 105 MMOL/L (ref 96–108)
CO2 SERPL-SCNC: 26 MMOL/L (ref 21–32)
CREAT SERPL-MCNC: 0.67 MG/DL (ref 0.6–1.3)
D DIMER PPP FEU-MCNC: 0.89 UG/ML FEU
EOSINOPHIL # BLD AUTO: 0.4 THOUSAND/ÂΜL (ref 0–0.61)
EOSINOPHIL NFR BLD AUTO: 5 % (ref 0–6)
ERYTHROCYTE [DISTWIDTH] IN BLOOD BY AUTOMATED COUNT: 12.5 % (ref 11.6–15.1)
EXT PREGNANCY TEST URINE: NEGATIVE
EXT. CONTROL: NORMAL
GFR SERPL CREATININE-BSD FRML MDRD: 113 ML/MIN/1.73SQ M
GLUCOSE SERPL-MCNC: 86 MG/DL (ref 65–140)
HCT VFR BLD AUTO: 41.1 % (ref 34.8–46.1)
HGB BLD-MCNC: 13.6 G/DL (ref 11.5–15.4)
IMM GRANULOCYTES # BLD AUTO: 0.02 THOUSAND/UL (ref 0–0.2)
IMM GRANULOCYTES NFR BLD AUTO: 0 % (ref 0–2)
LYMPHOCYTES # BLD AUTO: 3.58 THOUSANDS/ÂΜL (ref 0.6–4.47)
LYMPHOCYTES NFR BLD AUTO: 41 % (ref 14–44)
MCH RBC QN AUTO: 29.2 PG (ref 26.8–34.3)
MCHC RBC AUTO-ENTMCNC: 33.1 G/DL (ref 31.4–37.4)
MCV RBC AUTO: 88 FL (ref 82–98)
MONOCYTES # BLD AUTO: 0.53 THOUSAND/ÂΜL (ref 0.17–1.22)
MONOCYTES NFR BLD AUTO: 6 % (ref 4–12)
NEUTROPHILS # BLD AUTO: 4.2 THOUSANDS/ÂΜL (ref 1.85–7.62)
NEUTS SEG NFR BLD AUTO: 47 % (ref 43–75)
NRBC BLD AUTO-RTO: 0 /100 WBCS
PLATELET # BLD AUTO: 205 THOUSANDS/UL (ref 149–390)
PMV BLD AUTO: 11 FL (ref 8.9–12.7)
POTASSIUM SERPL-SCNC: 3.9 MMOL/L (ref 3.5–5.3)
RBC # BLD AUTO: 4.65 MILLION/UL (ref 3.81–5.12)
SODIUM SERPL-SCNC: 137 MMOL/L (ref 135–147)
WBC # BLD AUTO: 8.78 THOUSAND/UL (ref 4.31–10.16)

## 2025-06-29 PROCEDURE — 93005 ELECTROCARDIOGRAM TRACING: CPT

## 2025-06-29 PROCEDURE — 81025 URINE PREGNANCY TEST: CPT | Performed by: EMERGENCY MEDICINE

## 2025-06-29 PROCEDURE — 71045 X-RAY EXAM CHEST 1 VIEW: CPT

## 2025-06-29 PROCEDURE — 85379 FIBRIN DEGRADATION QUANT: CPT | Performed by: EMERGENCY MEDICINE

## 2025-06-29 PROCEDURE — 99285 EMERGENCY DEPT VISIT HI MDM: CPT | Performed by: EMERGENCY MEDICINE

## 2025-06-29 PROCEDURE — 80048 BASIC METABOLIC PNL TOTAL CA: CPT | Performed by: EMERGENCY MEDICINE

## 2025-06-29 PROCEDURE — 36415 COLL VENOUS BLD VENIPUNCTURE: CPT | Performed by: EMERGENCY MEDICINE

## 2025-06-29 PROCEDURE — 99285 EMERGENCY DEPT VISIT HI MDM: CPT

## 2025-06-29 PROCEDURE — 85025 COMPLETE CBC W/AUTO DIFF WBC: CPT | Performed by: EMERGENCY MEDICINE

## 2025-06-29 PROCEDURE — 71275 CT ANGIOGRAPHY CHEST: CPT

## 2025-06-29 PROCEDURE — 84484 ASSAY OF TROPONIN QUANT: CPT | Performed by: EMERGENCY MEDICINE

## 2025-06-29 RX ADMIN — IOHEXOL 85 ML: 350 INJECTION, SOLUTION INTRAVENOUS at 18:00

## 2025-06-29 NOTE — ED PROVIDER NOTES
Time reflects when diagnosis was documented in both MDM as applicable and the Disposition within this note       Time User Action Codes Description Comment    6/29/2025  7:12 PM Sherif Alvarado Add [R00.2] Palpitations           ED Disposition       ED Disposition   Discharge    Condition   Stable    Date/Time   Sun Jun 29, 2025  7:12 PM    Comment   Chandni Vinod discharge to home/self care.                   Assessment & Plan       Medical Decision Making  34-year-old female with palpitations.  Suspect adverse drug reaction given timing of new vitamin.  Also consider PE given prior history.  Low suspicion of ACS.  Obtain cardiopulmonary evaluation with labs, EKG, D-dimer.    Amount and/or Complexity of Data Reviewed  Labs: ordered.  Radiology: ordered and independent interpretation performed. Decision-making details documented in ED Course.    Risk  Prescription drug management.        ED Course as of 06/29/25 1917   Sun Jun 29, 2025 1911 CTA chest pe study       Medications   iohexol (OMNIPAQUE) 350 MG/ML injection (MULTI-DOSE) 85 mL (85 mL Intravenous Given 6/29/25 1800)       ED Risk Strat Scores                    No data recorded        SBIRT 20yo+      Flowsheet Row Most Recent Value   Initial Alcohol Screen: US AUDIT-C     1. How often do you have a drink containing alcohol? 0 Filed at: 06/29/2025 1646   2. How many drinks containing alcohol do you have on a typical day you are drinking?  0 Filed at: 06/29/2025 1646   3a. Male UNDER 65: How often do you have five or more drinks on one occasion? 0 Filed at: 06/29/2025 1646   3b. FEMALE Any Age, or MALE 65+: How often do you have 4 or more drinks on one occassion? 0 Filed at: 06/29/2025 1646   Audit-C Score 0 Filed at: 06/29/2025 1646   LIS: How many times in the past year have you...    Used an illegal drug or used a prescription medication for non-medical reasons? Never Filed at: 06/29/2025 1646                            History of Present Illness        Chief Complaint   Patient presents with    Palpitations     Pt states that a week ago with increased HR.        Past Medical History[1]   Past Surgical History[2]   Family History[3]   Social History[4]   E-Cigarette/Vaping    E-Cigarette Use Never User       E-Cigarette/Vaping Substances      I have reviewed and agree with the history as documented.     34-year-old female presents for evaluation of intermittent palpitations for the last week.  Patient states that she started taking a new vitamin that she bought over-the-counter a few days prior to symptoms starting.  Patient discontinued the new vitamin however symptoms still persisted which prompted ED evaluation.  Denies associated chest pain.  Has a prior history of provoked pulmonary embolism from birth control and is no longer on anticoagulation.  Does not feel similar to prior PE.        Review of Systems   Constitutional:  Negative for fever.   Cardiovascular:  Positive for palpitations.           Objective       ED Triage Vitals   Temperature Pulse Blood Pressure Respirations SpO2 Patient Position - Orthostatic VS   06/29/25 1634 06/29/25 1634 06/29/25 1634 06/29/25 1634 06/29/25 1634 06/29/25 1645   98.7 °F (37.1 °C) 85 138/75 18 99 % Sitting      Temp Source Heart Rate Source BP Location FiO2 (%) Pain Score    06/29/25 1634 06/29/25 1634 06/29/25 1645 -- --    Temporal Monitor Left arm        Vitals      Date and Time Temp Pulse SpO2 Resp BP Pain Score FACES Pain Rating User   06/29/25 1730 -- 69 99 % 18 104/59 -- --    06/29/25 1700 -- 70 98 % 18 114/58 -- --    06/29/25 1645 -- 75 100 % 18 129/61 -- -- AY   06/29/25 1634 98.7 °F (37.1 °C) 85 99 % 18 138/75 -- -- LD            Physical Exam  Vitals and nursing note reviewed.   Constitutional:       Appearance: She is well-developed.   HENT:      Head: Normocephalic and atraumatic.      Right Ear: External ear normal.      Left Ear: External ear normal.      Nose: Nose normal.     Eyes:       General: No scleral icterus.      Cardiovascular:      Rate and Rhythm: Normal rate and regular rhythm.   Pulmonary:      Effort: Pulmonary effort is normal. No respiratory distress.   Abdominal:      General: There is no distension.      Tenderness: There is no abdominal tenderness.     Musculoskeletal:         General: No deformity. Normal range of motion.      Cervical back: Normal range of motion.     Skin:     Findings: No rash.     Neurological:      General: No focal deficit present.      Mental Status: She is alert and oriented to person, place, and time.     Psychiatric:         Mood and Affect: Mood normal.         Results Reviewed       Procedure Component Value Units Date/Time    POCT pregnancy, urine [[id number]]  (Normal) Collected: 06/29/25 1740    Lab Status: Final result Updated: 06/29/25 1740     EXT Preg Test, Ur Negative     Control Valid    D-dimer, quantitative [[id number]]  (Abnormal) Collected: 06/29/25 1645    Lab Status: Final result Specimen: Blood from Arm, Right Updated: 06/29/25 1732     D-Dimer, Quant 0.89 ug/ml FEU     HS Troponin 0hr (reflex protocol) [[id number]]  (Normal) Collected: 06/29/25 1645    Lab Status: Final result Specimen: Blood from Arm, Right Updated: 06/29/25 1713     hs TnI 0hr <2 ng/L     Basic metabolic panel [[id number]] Collected: 06/29/25 1645    Lab Status: Final result Specimen: Blood from Arm, Right Updated: 06/29/25 1705     Sodium 137 mmol/L      Potassium 3.9 mmol/L      Chloride 105 mmol/L      CO2 26 mmol/L      ANION GAP 6 mmol/L      BUN 16 mg/dL      Creatinine 0.67 mg/dL      Glucose 86 mg/dL      Calcium 9.1 mg/dL      eGFR 113 ml/min/1.73sq m     Narrative:      [employer] guidelines for Chronic Kidney Disease (CKD):     Stage 1 with normal or high GFR (GFR > 90 mL/min/1.73 square meters)    Stage 2 Mild CKD (GFR = 60-89 mL/min/1.73 square meters)    Stage 3A Moderate CKD (GFR = 45-59 mL/min/1.73 square meters)    Stage 3B  Moderate CKD (GFR = 30-44 mL/min/1.73 square meters)    Stage 4 Severe CKD (GFR = 15-29 mL/min/1.73 square meters)    Stage 5 End Stage CKD (GFR <15 mL/min/1.73 square meters)  Note: GFR calculation is accurate only with a steady state creatinine    CBC and differential [[id number]] Collected: 06/29/25 1645    Lab Status: Final result Specimen: Blood from Arm, Right Updated: 06/29/25 1651     WBC 8.78 Thousand/uL      RBC 4.65 Million/uL      Hemoglobin 13.6 g/dL      Hematocrit 41.1 %      MCV 88 fL      MCH 29.2 pg      MCHC 33.1 g/dL      RDW 12.5 %      MPV 11.0 fL      Platelets 205 Thousands/uL      nRBC 0 /100 WBCs      Segmented % 47 %      Immature Grans % 0 %      Lymphocytes % 41 %      Monocytes % 6 %      Eosinophils Relative 5 %      Basophils Relative 1 %      Absolute Neutrophils 4.20 Thousands/µL      Absolute Immature Grans 0.02 Thousand/uL      Absolute Lymphocytes 3.58 Thousands/µL      Absolute Monocytes 0.53 Thousand/µL      Eosinophils Absolute 0.40 Thousand/µL      Basophils Absolute 0.05 Thousands/µL             CTA chest pe study   Final Interpretation by Sheldon Butler MD (06/29 1909)      No CT evidence of pulmonary embolism.                  Computerized Assisted Algorithm (CAA) may have aided analysis of applicable images.      Workstation performed: [id number]         X-ray chest 1 view portable   ED Interpretation by Sherif Alvarado DO (06/29 7911)   No acute cardiopulmonary disease          Procedures    ED Medication and Procedure Management   Prior to Admission Medications   Prescriptions Last Dose Informant Patient Reported? Taking?   albuterol (ProAir HFA) 90 mcg/act inhaler   No No   Sig: Inhale 2 puffs every 6 (six) hours as needed for wheezing   apixaban (ELIQUIS) 5 mg   No No   Sig: Take 1 tablet (5 mg total) by mouth 2 (two) times a day   fexofenadine (ALLEGRA) 60 MG tablet  Self Yes No   Sig: Take 60 mg by mouth daily   Patient not taking: Reported on 2/15/2023   sertraline  (ZOLOFT) 100 mg tablet   Yes No   Sig: Take 100 mg by mouth daily at bedtime      Facility-Administered Medications: None     Patient's Medications   Discharge Prescriptions    No medications on file     No discharge procedures on file.  ED SEPSIS DOCUMENTATION   Time reflects when diagnosis was documented in both MDM as applicable and the Disposition within this note       Time User Action Codes Description Comment    6/29/2025  7:12 PM Sherif Alvarado [R00.2] Palpitations                    [1]   Past Medical History:  Diagnosis Date    Depression     Pulmonary embolism (HCC)    [2]   Past Surgical History:  Procedure Laterality Date    DILATION AND CURETTAGE OF UTERUS     [3]   Family History  Problem Relation Name Age of Onset    Cancer Mother      Hyperlipidemia Father     [4]   Social History  Tobacco Use    Smoking status: Never    Smokeless tobacco: Never   Vaping Use    Vaping status: Never Used   Substance Use Topics    Alcohol use: Never    Drug use: Never        Sherif Alvarado DO  06/29/25 1917

## 2025-07-03 LAB
ATRIAL RATE: 77 BPM
P AXIS: 67 DEGREES
PR INTERVAL: 134 MS
QRS AXIS: 77 DEGREES
QRSD INTERVAL: 100 MS
QT INTERVAL: 400 MS
QTC INTERVAL: 452 MS
T WAVE AXIS: 53 DEGREES
VENTRICULAR RATE: 77 BPM

## 2025-07-03 PROCEDURE — 93010 ELECTROCARDIOGRAM REPORT: CPT | Performed by: INTERNAL MEDICINE
